# Patient Record
Sex: FEMALE | Race: WHITE | NOT HISPANIC OR LATINO | Employment: FULL TIME | URBAN - NONMETROPOLITAN AREA
[De-identification: names, ages, dates, MRNs, and addresses within clinical notes are randomized per-mention and may not be internally consistent; named-entity substitution may affect disease eponyms.]

---

## 2017-02-07 ENCOUNTER — OFFICE VISIT (OUTPATIENT)
Dept: INTERNAL MEDICINE | Facility: CLINIC | Age: 27
End: 2017-02-07

## 2017-02-07 VITALS
BODY MASS INDEX: 40.48 KG/M2 | TEMPERATURE: 98.5 F | OXYGEN SATURATION: 98 % | SYSTOLIC BLOOD PRESSURE: 116 MMHG | WEIGHT: 220 LBS | HEART RATE: 91 BPM | HEIGHT: 62 IN | DIASTOLIC BLOOD PRESSURE: 80 MMHG

## 2017-02-07 DIAGNOSIS — F41.9 ANXIETY: ICD-10-CM

## 2017-02-07 DIAGNOSIS — R63.5 WEIGHT GAIN: Primary | ICD-10-CM

## 2017-02-07 PROCEDURE — 99213 OFFICE O/P EST LOW 20 MIN: CPT | Performed by: PHYSICIAN ASSISTANT

## 2017-02-07 RX ORDER — SERTRALINE HYDROCHLORIDE 25 MG/1
25 TABLET, FILM COATED ORAL DAILY
Qty: 30 TABLET | Refills: 5 | Status: CANCELLED | OUTPATIENT
Start: 2017-02-07

## 2017-02-07 RX ORDER — CITALOPRAM 10 MG/1
10 TABLET ORAL DAILY
Qty: 30 TABLET | Refills: 0 | Status: SHIPPED | OUTPATIENT
Start: 2017-02-07 | End: 2017-03-07 | Stop reason: SDUPTHER

## 2017-02-07 NOTE — PROGRESS NOTES
Shae Allen is a 26 y.o. female.     Subjective   History of Present Illness   ?Here today for follow up of anxiety. Has been taking Zoloft which has been effective but may have contributed to weight gain of nearly 30 pounds. Sleeping well and feels she worries less about everything. Denies SI or HI.       The following portions of the patient's history were reviewed and updated as appropriate: allergies, current medications, past family history, past medical history, past social history, past surgical history and problem list.    Review of Systems    Constitutional: Weight gain.  Negative for appetite change, chills, fatigue, fever.  HENT: Negative for congestion, ear pain, hearing loss, nosebleeds, postnasal drip, rhinorrhea, sore throat, tinnitus and trouble swallowing.    Eyes: Negative for photophobia, discharge and visual disturbance.   Respiratory: Negative for cough, chest tightness, shortness of breath and wheezing.    Cardiovascular: Negative for chest pain, palpitations and leg swelling.   Gastrointestinal: Negative for abdominal distention, abdominal pain, blood in stool, constipation, diarrhea, nausea and vomiting.   Endocrine: Negative for cold intolerance, heat intolerance, polydipsia, polyphagia and polyuria.   Musculoskeletal: Negative for arthralgias, back pain, joint swelling, myalgias, neck pain and neck stiffness.   Skin: Negative for color change, pallor, rash and wound.   Allergic/Immunologic: Negative for environmental allergies, food allergies and immunocompromised state.   Neurological: Negative for dizziness, tremors, seizures, weakness, numbness and headaches.   Hematological: Negative for adenopathy. Does not bruise/bleed easily.   Psychiatric/Behavioral: Anxiety. Negative for agitation, behavioral problems, confusion, hallucinations, self-injury and suicidal ideas.     Objective    Physical Exam  Constitutional: Oriented to person, place, and time. Appears well-developed and  "well-nourished.   HENT:   Head: Normocephalic and atraumatic.   Eyes: EOM are normal. Pupils are equal, round, and reactive to light.   Neck: Normal range of motion. Neck supple.   Cardiovascular: Normal rate, regular rhythm and normal heart sounds.    Pulmonary/Chest: Effort normal and breath sounds normal. No respiratory distress.  Has no wheezes or rales. Exhibits no chest wall tenderness.   Abdominal: Soft. Bowel sounds are normal. Exhibits no distension and no mass. There is no tenderness.   Musculoskeletal: Normal range of motion. Exhibits no tenderness.   Neurological: Alert and oriented to person, place, and time.   Skin: Skin is warm and dry.   Psychiatric: Has a normal mood and affect. Behavior is normal. Judgment and thought content normal.       Visit Vitals   • /80   • Pulse 91   • Temp 98.5 °F (36.9 °C)   • Ht 62\" (157.5 cm)   • Wt 220 lb (99.8 kg)   • SpO2 98%   • BMI 40.24 kg/m2       Nursing note and vitals reviewed.        Assessment/Plan   Shae was seen today for follow-up and anxiety.    Diagnoses and all orders for this visit:    Weight gain    Anxiety  -     citalopram (CeleXA) 10 MG tablet; Take 1 tablet by mouth Daily.    Discontinue due to weight gain: sertraline (ZOLOFT) 25 MG tablet; Take 1 tablet by mouth Daily.               "

## 2017-03-07 ENCOUNTER — OFFICE VISIT (OUTPATIENT)
Dept: INTERNAL MEDICINE | Facility: CLINIC | Age: 27
End: 2017-03-07

## 2017-03-07 VITALS
OXYGEN SATURATION: 98 % | HEIGHT: 62 IN | DIASTOLIC BLOOD PRESSURE: 80 MMHG | WEIGHT: 218 LBS | TEMPERATURE: 98.4 F | SYSTOLIC BLOOD PRESSURE: 112 MMHG | BODY MASS INDEX: 40.12 KG/M2 | HEART RATE: 86 BPM

## 2017-03-07 DIAGNOSIS — F41.9 ANXIETY: Primary | ICD-10-CM

## 2017-03-07 PROCEDURE — 99213 OFFICE O/P EST LOW 20 MIN: CPT | Performed by: PHYSICIAN ASSISTANT

## 2017-03-07 RX ORDER — CITALOPRAM 10 MG/1
10 TABLET ORAL DAILY
Qty: 90 TABLET | Refills: 1 | Status: SHIPPED | OUTPATIENT
Start: 2017-03-07 | End: 2017-08-14 | Stop reason: SDUPTHER

## 2017-03-07 NOTE — PROGRESS NOTES
Shae Allen is a 26 y.o. female.     Subjective   History of Present Illness   Here today for follow up of anxiety. Has been using Celexa 10 mg and feels it has been helping to control her anxiety and feels this dose is effective for her. Has been sleeping well. Denies SI or HI. Has not had any weight gain with Celexa which she had with Zoloft.       The following portions of the patient's history were reviewed and updated as appropriate: allergies, current medications, past family history, past medical history, past social history, past surgical history and problem list.    Review of Systems    Constitutional: Negative for appetite change, chills, fatigue, fever and unexpected weight change.   HENT: Negative for congestion, ear pain, hearing loss, nosebleeds, postnasal drip, rhinorrhea, sore throat, tinnitus and trouble swallowing.    Eyes: Negative for photophobia, discharge and visual disturbance.   Respiratory: Negative for cough, chest tightness, shortness of breath and wheezing.    Cardiovascular: Negative for chest pain, palpitations and leg swelling.   Gastrointestinal: Negative for abdominal distention, abdominal pain, blood in stool, constipation, diarrhea, nausea and vomiting.   Endocrine: Negative for cold intolerance, heat intolerance, polydipsia, polyphagia and polyuria.   Musculoskeletal: Negative for arthralgias, back pain, joint swelling, myalgias, neck pain and neck stiffness.   Skin: Negative for color change, pallor, rash and wound.   Allergic/Immunologic: Negative for environmental allergies, food allergies and immunocompromised state.   Neurological: Negative for dizziness, tremors, seizures, weakness, numbness and headaches.   Hematological: Negative for adenopathy. Does not bruise/bleed easily.   Psychiatric/Behavioral: Anxiety. Negative for sleep disturbances, agitation, behavioral problems, confusion, hallucinations, self-injury and suicidal ideas.     Objective    Physical  "Exam  Constitutional: Oriented to person, place, and time. Appears well-developed and well-nourished.   HENT:   Head: Normocephalic and atraumatic.   Eyes: EOM are normal. Pupils are equal, round, and reactive to light.   Neck: Normal range of motion. Neck supple.   Cardiovascular: Normal rate, regular rhythm and normal heart sounds.    Pulmonary/Chest: Effort normal and breath sounds normal. No respiratory distress.  Has no wheezes or rales. Exhibits no chest wall tenderness.   Abdominal: Soft. Bowel sounds are normal. Exhibits no distension and no mass. There is no tenderness.   Musculoskeletal: Normal range of motion. Exhibits no tenderness.   Neurological: Alert and oriented to person, place, and time.   Skin: Skin is warm and dry.   Psychiatric: Has a normal mood and affect. Behavior is normal. Judgment and thought content normal.       Visit Vitals   • /80   • Pulse 86   • Temp 98.4 °F (36.9 °C)   • Ht 62\" (157.5 cm)   • Wt 218 lb (98.9 kg)   • SpO2 98%   • BMI 39.87 kg/m2       Nursing note and vitals reviewed.        Assessment/Plan   Shae was seen today for follow-up and anxiety.    Diagnoses and all orders for this visit:    Anxiety  -     citalopram (CeleXA) 10 MG tablet; Take 1 tablet by mouth Daily.      Well controlled with Celexa. Continue daily use. F/u 6 months or sooner if needed.          "

## 2017-06-02 DIAGNOSIS — Z30.011 INITIATION OF OCP (BCP): ICD-10-CM

## 2017-06-02 RX ORDER — NORGESTIMATE AND ETHINYL ESTRADIOL 7DAYSX3 28
1 KIT ORAL DAILY
Qty: 28 TABLET | Refills: 12 | Status: SHIPPED | OUTPATIENT
Start: 2017-06-02 | End: 2017-09-24

## 2017-08-14 DIAGNOSIS — F41.9 ANXIETY: ICD-10-CM

## 2017-08-14 RX ORDER — CITALOPRAM 10 MG/1
10 TABLET ORAL DAILY
Qty: 90 TABLET | Refills: 1 | Status: SHIPPED | OUTPATIENT
Start: 2017-08-14 | End: 2017-09-11 | Stop reason: SDUPTHER

## 2017-09-11 ENCOUNTER — OFFICE VISIT (OUTPATIENT)
Dept: INTERNAL MEDICINE | Facility: CLINIC | Age: 27
End: 2017-09-11

## 2017-09-11 VITALS
DIASTOLIC BLOOD PRESSURE: 70 MMHG | HEART RATE: 85 BPM | OXYGEN SATURATION: 99 % | SYSTOLIC BLOOD PRESSURE: 122 MMHG | WEIGHT: 216 LBS | RESPIRATION RATE: 12 BRPM | HEIGHT: 62 IN | BODY MASS INDEX: 39.75 KG/M2

## 2017-09-11 DIAGNOSIS — F41.1 GENERALIZED ANXIETY DISORDER: ICD-10-CM

## 2017-09-11 DIAGNOSIS — J45.20 ALLERGIC ASTHMA, MILD INTERMITTENT, UNCOMPLICATED: Primary | ICD-10-CM

## 2017-09-11 DIAGNOSIS — Z30.09 BIRTH CONTROL COUNSELING: ICD-10-CM

## 2017-09-11 PROBLEM — J45.909 ALLERGIC ASTHMA: Status: ACTIVE | Noted: 2017-09-11

## 2017-09-11 PROCEDURE — 99213 OFFICE O/P EST LOW 20 MIN: CPT | Performed by: FAMILY MEDICINE

## 2017-09-11 RX ORDER — CITALOPRAM 10 MG/1
10 TABLET ORAL DAILY
Qty: 90 TABLET | Refills: 1 | Status: SHIPPED | OUTPATIENT
Start: 2017-09-11 | End: 2018-02-19 | Stop reason: SDUPTHER

## 2017-09-11 RX ORDER — MONTELUKAST SODIUM 10 MG/1
10 TABLET ORAL NIGHTLY
Qty: 30 TABLET | Refills: 2 | Status: SHIPPED | OUTPATIENT
Start: 2017-09-11 | End: 2017-12-27 | Stop reason: SDUPTHER

## 2017-09-11 NOTE — PROGRESS NOTES
Est care with Dr. Ness. Needs refills Celexa 10 mg. She is doing well on this dose.        SUBJECTIVE: Shae Allen is a 27 y.o. female seen for a follow up visit;    Subjective   Shae Allen is a 27 y.o. female who presents for follow up of depression/anxiety.  At her last visit anti-depressant medication was started.   She complains of the following side effects from the treatment: none.  Symptoms have been gradually improving since starting treatment.  Depression symptoms still include none. Symptoms that have resolved include: depressed mood, anhedonia, anxiety and panic attacks.  Patient denies recurrent thoughts of death, suicidal thoughts without plan, suicidal thoughts with specific plan and panic attacks.  Previous treatment includes: medication.        OB History    Para Term  AB Living   0 0 0 0 0 0   SAB TAB Ectopic Multiple Live Births   0 0 0 0            Period Cycle (Days): 28  Period Duration (Days): 5 days, heavy x 1   Period Pattern: Regular  Menstrual Flow: Moderate  Sexual History:  Sexually Transmitted Infection History: None     reports that she currently engages in sexual activity and has had male partners. She reports using the following method of birth control/protection: OCP.      The following portions of the patient's history were reviewed and updated as appropriate: She  has a past medical history of Eczema and Pneumonia.  She has Generalized anxiety disorder on her pertinent problem list.  She  has a past surgical history that includes Myringotomy w/ tubes.  Her family history includes Arthritis in her paternal grandfather and paternal grandmother; Breast cancer in her paternal grandmother; COPD in her paternal grandmother; Cancer in her paternal grandmother and paternal uncle; Diabetes in her paternal grandfather; Hypertension in her mother; Lung cancer in her paternal grandmother; Migraines in her mother; No Known Problems in her father, maternal aunt,  "maternal aunt, maternal aunt, and maternal grandmother; Thyroid disease in her mother and paternal grandfather.  She  reports that she has never smoked. She has never used smokeless tobacco. She reports that she does not drink alcohol or use illicit drugs.  She has a current medication list which includes the following prescription(s): citalopram, fluticasone, loratadine, norgestimate-ethinyl estradiol, ventolin hfa, and montelukast..    Review of Systems   Constitutional: Negative.    Respiratory: Negative.    Cardiovascular: Negative.    Genitourinary: Negative.    Neurological: Negative.    Psychiatric/Behavioral: Negative.          OBJECTIVE:  /70  Pulse 85  Resp 12  Ht 62\" (157.5 cm)  Wt 216 lb (98 kg)  LMP 08/23/2017 (Exact Date)  SpO2 99%  BMI 39.51 kg/m2     Physical Exam   Constitutional: She appears well-developed and well-nourished. No distress.           ASSESSMENT:   Diagnosis Plan   1. Allergic asthma, mild intermittent, uncomplicated  montelukast (SINGULAIR) 10 MG tablet   2. Birth control counseling     3. Generalized anxiety disorder  citalopram (CeleXA) 10 MG tablet         Birth control counseling done today.  Discussed risks and benefits of birth control pill, patch no, ring no, nexplanon implant and mirena IUD.  Pt has decided to proceed with long acting birth control.    She has decided to proceed with nexplanon.  Risks and benefits of this device, including irregular periods, have been discussed with her.  Paperwork signed to order device and appointment for placement will be scheduled when device is received or insurance approval is gained.          I, Carmen Ness MD, hereby attest that the medical record entry above accurately reflects signatures/notations that I made in my capacity as Carmen Ness MD when I treated and diagnosed the above patient.  I do hereby attest that his information is true, accurate, and complete to the best of my knowledge and I understand that " any falsification, omission, or concealment of material fact may subject me to administrative, civil, or criminal liability.

## 2017-09-19 ENCOUNTER — TELEPHONE (OUTPATIENT)
Dept: INTERNAL MEDICINE | Facility: CLINIC | Age: 27
End: 2017-09-19

## 2017-09-21 ENCOUNTER — OFFICE VISIT (OUTPATIENT)
Dept: INTERNAL MEDICINE | Facility: CLINIC | Age: 27
End: 2017-09-21

## 2017-09-21 VITALS
WEIGHT: 216 LBS | HEART RATE: 77 BPM | OXYGEN SATURATION: 98 % | HEIGHT: 62 IN | BODY MASS INDEX: 39.75 KG/M2 | RESPIRATION RATE: 12 BRPM | DIASTOLIC BLOOD PRESSURE: 80 MMHG | SYSTOLIC BLOOD PRESSURE: 124 MMHG

## 2017-09-21 DIAGNOSIS — Z30.017 INSERTION OF NEXPLANON: Primary | ICD-10-CM

## 2017-09-21 LAB
B-HCG UR QL: NEGATIVE
INTERNAL NEGATIVE CONTROL: NEGATIVE
INTERNAL POSITIVE CONTROL: POSITIVE
Lab: NORMAL

## 2017-09-21 PROCEDURE — 11981 INSERTION DRUG DLVR IMPLANT: CPT | Performed by: FAMILY MEDICINE

## 2017-09-21 PROCEDURE — 81025 URINE PREGNANCY TEST: CPT | Performed by: FAMILY MEDICINE

## 2017-09-21 NOTE — PROGRESS NOTES
Nexplanon insertion  Nexplanon Insertion Procedure Note    Pre-operative Diagnosis: desires contraception    Post-operative Diagnosis: same    Indications: contraception    Procedure Details   Urine pregnancy test was done and result was negative.  The risks (including infection, bruising, irregular bleeding, pain at insertion site, and injury to muscles, nerves and blood vessesl) and benefits of the procedure were explained to the patient and/or guardian and Written informed consent was obtained.      Insertion site 10 cm from medial epicondyle of left arm, in between tricep and bicep tendons, was marked, painted with Betadine, and allowed to dry.  Insertion site anesthetized with 3 ml 2% lidocaine without epi.  Nexplanon device was inserted according to manufacturers instructions.  Site closed with steristrips.  Pt allowed to feel device in place.  Pressure dressing applied.  There were no complications.        Nexplanon Information:  nexplanon: Lot # [id number], Expiration date 08/2019      Condition:  Stable    Complications:  None    Plan:    The patient was advised to call for any rash, arm pain, fever, warmth or for prolonged bruising or bleeding. She was advised to use OTC ibuprofen as needed for mild to moderate pain.

## 2017-11-20 ENCOUNTER — OFFICE VISIT (OUTPATIENT)
Dept: INTERNAL MEDICINE | Facility: CLINIC | Age: 27
End: 2017-11-20

## 2017-11-20 VITALS
SYSTOLIC BLOOD PRESSURE: 110 MMHG | WEIGHT: 217 LBS | BODY MASS INDEX: 39.93 KG/M2 | RESPIRATION RATE: 12 BRPM | HEIGHT: 62 IN | HEART RATE: 85 BPM | DIASTOLIC BLOOD PRESSURE: 70 MMHG | OXYGEN SATURATION: 98 %

## 2017-11-20 DIAGNOSIS — Z23 NEEDS FLU SHOT: ICD-10-CM

## 2017-11-20 DIAGNOSIS — J45.20 MILD INTERMITTENT EXTRINSIC ASTHMA WITHOUT COMPLICATION: Primary | ICD-10-CM

## 2017-11-20 PROCEDURE — 90686 IIV4 VACC NO PRSV 0.5 ML IM: CPT | Performed by: FAMILY MEDICINE

## 2017-11-20 PROCEDURE — 99213 OFFICE O/P EST LOW 20 MIN: CPT | Performed by: FAMILY MEDICINE

## 2017-11-20 PROCEDURE — 90471 IMMUNIZATION ADMIN: CPT | Performed by: FAMILY MEDICINE

## 2017-11-20 RX ORDER — ETONOGESTREL 68 MG/1
IMPLANT SUBCUTANEOUS
COMMUNITY
Start: 2017-09-16 | End: 2018-11-07

## 2017-11-20 RX ORDER — ALBUTEROL SULFATE 90 UG/1
2 AEROSOL, METERED RESPIRATORY (INHALATION) EVERY 6 HOURS PRN
Qty: 1 INHALER | Refills: 2 | Status: SHIPPED | OUTPATIENT
Start: 2017-11-20

## 2017-12-27 DIAGNOSIS — J45.20 ALLERGIC ASTHMA, MILD INTERMITTENT, UNCOMPLICATED: ICD-10-CM

## 2017-12-28 RX ORDER — MONTELUKAST SODIUM 10 MG/1
TABLET ORAL
Qty: 30 TABLET | Refills: 5 | Status: SHIPPED | OUTPATIENT
Start: 2017-12-28 | End: 2018-07-16 | Stop reason: SDUPTHER

## 2018-02-19 ENCOUNTER — OFFICE VISIT (OUTPATIENT)
Dept: INTERNAL MEDICINE | Facility: CLINIC | Age: 28
End: 2018-02-19

## 2018-02-19 VITALS
BODY MASS INDEX: 41.04 KG/M2 | SYSTOLIC BLOOD PRESSURE: 114 MMHG | HEART RATE: 92 BPM | RESPIRATION RATE: 12 BRPM | WEIGHT: 223 LBS | OXYGEN SATURATION: 98 % | DIASTOLIC BLOOD PRESSURE: 70 MMHG | HEIGHT: 62 IN

## 2018-02-19 DIAGNOSIS — J45.20 MILD INTERMITTENT EXTRINSIC ASTHMA WITHOUT COMPLICATION: ICD-10-CM

## 2018-02-19 DIAGNOSIS — J06.9 VIRAL URI: ICD-10-CM

## 2018-02-19 DIAGNOSIS — Z00.00 HEALTHCARE MAINTENANCE: ICD-10-CM

## 2018-02-19 DIAGNOSIS — F41.1 GENERALIZED ANXIETY DISORDER: Primary | ICD-10-CM

## 2018-02-19 PROCEDURE — 99213 OFFICE O/P EST LOW 20 MIN: CPT | Performed by: FAMILY MEDICINE

## 2018-02-19 RX ORDER — CITALOPRAM 10 MG/1
10 TABLET ORAL DAILY
Qty: 90 TABLET | Refills: 3 | Status: SHIPPED | OUTPATIENT
Start: 2018-02-19 | End: 2018-10-08 | Stop reason: SDUPTHER

## 2018-02-19 RX ORDER — BROMPHENIRAMINE MALEATE, PSEUDOEPHEDRINE HYDROCHLORIDE, AND DEXTROMETHORPHAN HYDROBROMIDE 2; 30; 10 MG/5ML; MG/5ML; MG/5ML
SYRUP ORAL
COMMUNITY
Start: 2018-02-15 | End: 2018-02-19

## 2018-02-19 NOTE — PROGRESS NOTES
Follow up visit. C/O cold sx's past few weeks. She went to the [facility] last Thursday for right ear ache. Still stopped up today, would like to have it checked.     SUBJECTIVE: Shae Allen is a 27 y.o. female seen for a follow up visit;    Sinus Pain  Patient complains of congestion, cough, facial pain, frequent clearing of the throat, headaches, mouth breathing, post nasal drip, sinus pressure and sore throat. Onset of symptoms was 12 days ago. Symptoms have been gradually improving since that time. She is drinking plenty of fluids.  Past history is significant for asthma. Patient is non-smoker.     Asthma: has been having to use it a bit more due to colds, only about once a week.      The following portions of the patient's history were reviewed and updated as appropriate: She  has a past medical history of Eczema and Pneumonia.  She has Generalized anxiety disorder on her pertinent problem list.  She  has a past surgical history that includes Myringotomy w/ tubes.  Her family history includes Arthritis in her paternal grandfather and paternal grandmother; Breast cancer in her paternal grandmother; COPD in her paternal grandmother; Cancer in her paternal grandmother and paternal uncle; Diabetes in her paternal grandfather; Hypertension in her mother; Lung cancer in her paternal grandmother; Migraines in her mother; No Known Problems in her father, maternal aunt, maternal aunt, maternal aunt, and maternal grandmother; Thyroid disease in her mother and paternal grandfather.  She  reports that she has never smoked. She has never used smokeless tobacco. She reports that she does not drink alcohol or use illicit drugs.  She has a current medication list which includes the following prescription(s): albuterol, citalopram, fluticasone, loratadine, montelukast, and nexplanon..    Review of Systems   Constitutional: Negative.    HENT: Positive for congestion and ear pain.    Respiratory: Negative.   "  Cardiovascular: Negative.    Psychiatric/Behavioral: Negative.  Negative for sleep disturbance. The patient is not nervous/anxious.          OBJECTIVE:  /70  Pulse 92  Resp 12  Ht 157.5 cm (62\")  Wt 101 kg (223 lb)  SpO2 98%  BMI 40.79 kg/m2     Physical Exam   Constitutional: She appears well-developed and well-nourished. No distress.   HENT:   Head: Normocephalic and atraumatic.   Right Ear: Tympanic membrane, external ear and ear canal normal.   Left Ear: Tympanic membrane, external ear and ear canal normal.   Nose: Mucosal edema and rhinorrhea present.   Mouth/Throat: No uvula swelling. Posterior oropharyngeal erythema present. No tonsillar abscesses. No tonsillar exudate.   Eyes: Conjunctivae are normal. Pupils are equal, round, and reactive to light.   Cardiovascular: Normal rate and regular rhythm.    Pulmonary/Chest: Effort normal and breath sounds normal.           ASSESSMENT:   Diagnosis Plan   1. Generalized anxiety disorder  citalopram (CeleXA) 10 MG tablet   2. Healthcare maintenance  Comprehensive Metabolic Panel    TSH    T4   3. Mild intermittent extrinsic asthma without complication     4. Viral URI           Patient advised to call if worsening in 4 days or not resolved in 8 days.  Will prescribe bacterial sinus infection antibiotics at that time.  Recommended afrin x 5 days.     Asthma - stable      Return in about 6 months (around 8/19/2018).                "

## 2018-02-20 LAB
ALBUMIN SERPL-MCNC: 4.4 G/DL (ref 3.5–5)
ALBUMIN/GLOB SERPL: 1.5 G/DL (ref 1–2)
ALP SERPL-CCNC: 98 U/L (ref 38–126)
ALT SERPL-CCNC: 35 U/L (ref 13–69)
AST SERPL-CCNC: 21 U/L (ref 15–46)
BILIRUB SERPL-MCNC: 0.4 MG/DL (ref 0.2–1.3)
BUN SERPL-MCNC: 12 MG/DL (ref 7–20)
BUN/CREAT SERPL: 15 (ref 7.1–23.5)
CALCIUM SERPL-MCNC: 9.7 MG/DL (ref 8.4–10.2)
CHLORIDE SERPL-SCNC: 104 MMOL/L (ref 98–107)
CO2 SERPL-SCNC: 27 MMOL/L (ref 26–30)
CREAT SERPL-MCNC: 0.8 MG/DL (ref 0.6–1.3)
GFR SERPLBLD CREATININE-BSD FMLA CKD-EPI: 104 ML/MIN/1.73
GFR SERPLBLD CREATININE-BSD FMLA CKD-EPI: 85 ML/MIN/1.73
GLOBULIN SER CALC-MCNC: 2.9 GM/DL
GLUCOSE SERPL-MCNC: 89 MG/DL (ref 74–98)
POTASSIUM SERPL-SCNC: 3.9 MMOL/L (ref 3.5–5.1)
PROT SERPL-MCNC: 7.3 G/DL (ref 6.3–8.2)
SODIUM SERPL-SCNC: 146 MMOL/L (ref 137–145)
T4 SERPL-MCNC: 8.3 UG/DL (ref 4.5–12)
TSH SERPL DL<=0.005 MIU/L-ACNC: 2.47 MIU/ML (ref 0.47–4.68)

## 2018-07-16 DIAGNOSIS — J45.20 ALLERGIC ASTHMA, MILD INTERMITTENT, UNCOMPLICATED: ICD-10-CM

## 2018-07-16 RX ORDER — MONTELUKAST SODIUM 10 MG/1
10 TABLET ORAL EVERY EVENING
Qty: 30 TABLET | Refills: 5 | Status: SHIPPED | OUTPATIENT
Start: 2018-07-16 | End: 2018-10-08 | Stop reason: SDUPTHER

## 2018-10-08 ENCOUNTER — OFFICE VISIT (OUTPATIENT)
Dept: INTERNAL MEDICINE | Facility: CLINIC | Age: 28
End: 2018-10-08

## 2018-10-08 VITALS
OXYGEN SATURATION: 98 % | TEMPERATURE: 98.6 F | SYSTOLIC BLOOD PRESSURE: 120 MMHG | HEART RATE: 80 BPM | BODY MASS INDEX: 37.38 KG/M2 | HEIGHT: 62 IN | WEIGHT: 203.13 LBS | DIASTOLIC BLOOD PRESSURE: 80 MMHG

## 2018-10-08 DIAGNOSIS — Z97.5 NEXPLANON IN PLACE: ICD-10-CM

## 2018-10-08 DIAGNOSIS — J45.20 ALLERGIC ASTHMA, MILD INTERMITTENT, UNCOMPLICATED: ICD-10-CM

## 2018-10-08 DIAGNOSIS — F41.1 GENERALIZED ANXIETY DISORDER: Primary | ICD-10-CM

## 2018-10-08 PROCEDURE — 99213 OFFICE O/P EST LOW 20 MIN: CPT | Performed by: FAMILY MEDICINE

## 2018-10-08 RX ORDER — MONTELUKAST SODIUM 10 MG/1
10 TABLET ORAL EVERY EVENING
Qty: 90 TABLET | Refills: 3 | Status: SHIPPED | OUTPATIENT
Start: 2018-10-08 | End: 2018-12-28 | Stop reason: SDUPTHER

## 2018-10-08 RX ORDER — CITALOPRAM 10 MG/1
10 TABLET ORAL DAILY
Qty: 90 TABLET | Refills: 3 | Status: SHIPPED | OUTPATIENT
Start: 2018-10-08 | End: 2018-12-22

## 2018-10-08 NOTE — PROGRESS NOTES
"oJycelyn Allen is a 28 y.o. female here for:  Chief Complaint   Patient presents with   • Establish Care     Establish care with Dr. Khan, Transfer of care from Dr. Ness   • Anxiety     8 month follow up for Anxiety     History of Present Illness   Feels anxiety is doing well on Celexa. Nexplanon in place x 1 year. Put in as she was forgetting pills. Went a long time without period now has irregular bleeding and feels more mcgregor, irritable. Acne worsened. She would like to have it removed and return to oral contraceptive pill. No history of blood clot nor migraine, not a smoker.    History of reactive airways, less albuterol use since starting singulair.    The following portions of the patient's history were reviewed and updated as appropriate: allergies, current medications, past family history, past medical history, past social history, past surgical history and problem list.    Review of Systems   Genitourinary: Positive for menstrual problem.   Skin: Positive for skin lesions (acne).   Psychiatric/Behavioral: Positive for agitation. The patient is not nervous/anxious.        Vitals:    10/08/18 1608   BP: 120/80   Pulse: 80   Temp: 98.6 °F (37 °C)   SpO2: 98%   Weight: 92.1 kg (203 lb 2 oz)   Height: 157.5 cm (62.01\")         Objective   Physical Exam   Constitutional: She is oriented to person, place, and time. Vital signs are normal. She appears well-developed and well-nourished. She is active. She does not appear ill. No distress. She is obese.  HENT:   Head: Normocephalic and atraumatic.   Right Ear: Hearing normal.   Left Ear: Hearing normal.   Mouth/Throat: Mucous membranes are not dry.   Eyes: EOM are normal. No scleral icterus.   Neck: Neck supple.   Pulmonary/Chest: Effort normal.   Neurological: She is alert and oriented to person, place, and time. No cranial nerve deficit.   Skin: Skin is warm. She is not diaphoretic.   Psychiatric: She has a normal mood and affect. Her behavior " is normal.   Nursing note and vitals reviewed.        Assessment/Plan     Problem List Items Addressed This Visit        Other    Generalized anxiety disorder - Primary    Relevant Medications    citalopram (CeleXA) 10 MG tablet      Other Visit Diagnoses     Allergic asthma, mild intermittent, uncomplicated        Relevant Medications    montelukast (SINGULAIR) 10 MG tablet    Nexplanon in place              · She'll return to clinic to have Nexplanon removed and will resume oral contraceptive pill.    Return nexplanon removal when she can come in, 30 min.    Kami Khan MD

## 2018-11-07 ENCOUNTER — PROCEDURE VISIT (OUTPATIENT)
Dept: INTERNAL MEDICINE | Facility: CLINIC | Age: 28
End: 2018-11-07

## 2018-11-07 VITALS
WEIGHT: 203.38 LBS | DIASTOLIC BLOOD PRESSURE: 80 MMHG | OXYGEN SATURATION: 98 % | HEART RATE: 92 BPM | SYSTOLIC BLOOD PRESSURE: 115 MMHG | HEIGHT: 62 IN | TEMPERATURE: 98.2 F | BODY MASS INDEX: 37.43 KG/M2

## 2018-11-07 DIAGNOSIS — Z30.011 INITIATION OF OCP (BCP): ICD-10-CM

## 2018-11-07 DIAGNOSIS — Z30.46 NEXPLANON REMOVAL: Primary | ICD-10-CM

## 2018-11-07 DIAGNOSIS — Z23 NEED FOR INFLUENZA VACCINATION: ICD-10-CM

## 2018-11-07 PROCEDURE — 11982 REMOVE DRUG IMPLANT DEVICE: CPT | Performed by: FAMILY MEDICINE

## 2018-11-07 PROCEDURE — 90471 IMMUNIZATION ADMIN: CPT | Performed by: FAMILY MEDICINE

## 2018-11-07 PROCEDURE — 90674 CCIIV4 VAC NO PRSV 0.5 ML IM: CPT | Performed by: FAMILY MEDICINE

## 2018-11-07 RX ORDER — NORGESTIMATE AND ETHINYL ESTRADIOL 7DAYSX3 28
1 KIT ORAL DAILY
Qty: 28 TABLET | Refills: 12 | Status: SHIPPED | OUTPATIENT
Start: 2018-11-07 | End: 2019-11-01 | Stop reason: SDUPTHER

## 2018-11-08 NOTE — PROGRESS NOTES
Procedure   Remove & Insert Drug Implant  Date/Time: 11/7/2018 3:30 PM  Performed by: DONALDO MAJANO  Authorized by: DONALDO MAJANO   Consent: Verbal consent obtained. Written consent obtained.  Risks and benefits: risks, benefits and alternatives were discussed  Consent given by: patient  Patient understanding: patient states understanding of the procedure being performed  Patient consent: the patient's understanding of the procedure matches consent given  Procedure consent: procedure consent matches procedure scheduled  Relevant documents: relevant documents present and verified  Site marked: the operative site was marked  Required items: required blood products, implants, devices, and special equipment available  Patient identity confirmed: verbally with patient  Preparation: Left upper arm medial aspect, area of nexplanon insertion, cleaned with iodine x 3.  Local anesthesia used: yes    Anesthesia:  Local anesthesia used: yes  Local Anesthetic: lidocaine 2% with epinephrine  Anesthetic total: 1 mL    Sedation:  Patient sedated: no  Patient tolerance: Patient tolerated the procedure well with no immediate complications  Comments: Using scalpel after skin anesthetized a small incision was made and the distal tip of Nexplanon was grabbed using needle . The device came out smoothly and in one piece. Arm had to be palpated and manipulated for several minutes to get the tip of the Nexplanon to the incision. Estimated blood loss minimal. Steri-strips placed with incision edges approximated as close together as possible to minimize scaring. Second Steri-Strip adhered quickly and was not completely flush with skin. No complications.      Shae was seen today for procedure.    Diagnoses and all orders for this visit:    Nexplanon removal  -     Remove & Insert Drug Implant    Initiation of OCP (BCP)  -     norgestimate-ethinyl estradiol (ORTHO TRI-CYCLEN,TRINESSA) 0.18/0.215/0.25 MG-35 MCG per  tablet; Take 1 tablet by mouth Daily.    Need for influenza vaccination  -     Flucelvax Quad=>4Years ([id number])      Patient requested resuming her oral contraceptive pill, felt she could remember to take. She did not like the acne and irregular bleeding from her Nexplanon.

## 2018-12-22 DIAGNOSIS — F41.1 GENERALIZED ANXIETY DISORDER: ICD-10-CM

## 2018-12-22 RX ORDER — CITALOPRAM 20 MG/1
20 TABLET ORAL DAILY
Qty: 90 TABLET | Refills: 3 | Status: SHIPPED | OUTPATIENT
Start: 2018-12-22 | End: 2019-12-03 | Stop reason: SDUPTHER

## 2018-12-28 DIAGNOSIS — J45.20 ALLERGIC ASTHMA, MILD INTERMITTENT, UNCOMPLICATED: ICD-10-CM

## 2018-12-28 RX ORDER — MONTELUKAST SODIUM 10 MG/1
10 TABLET ORAL EVERY EVENING
Qty: 90 TABLET | Refills: 2 | Status: SHIPPED | OUTPATIENT
Start: 2018-12-28 | End: 2019-09-10 | Stop reason: SDUPTHER

## 2019-09-10 DIAGNOSIS — J45.20 ALLERGIC ASTHMA, MILD INTERMITTENT, UNCOMPLICATED: ICD-10-CM

## 2019-09-10 RX ORDER — MONTELUKAST SODIUM 10 MG/1
TABLET ORAL
Qty: 30 TABLET | Refills: 1 | Status: SHIPPED | OUTPATIENT
Start: 2019-09-10 | End: 2019-11-05 | Stop reason: SDUPTHER

## 2019-11-01 ENCOUNTER — PROCEDURE VISIT (OUTPATIENT)
Dept: OBSTETRICS AND GYNECOLOGY | Facility: CLINIC | Age: 29
End: 2019-11-01

## 2019-11-01 VITALS
HEIGHT: 62 IN | BODY MASS INDEX: 40.3 KG/M2 | WEIGHT: 219 LBS | DIASTOLIC BLOOD PRESSURE: 82 MMHG | SYSTOLIC BLOOD PRESSURE: 136 MMHG

## 2019-11-01 DIAGNOSIS — Z12.4 SCREENING FOR MALIGNANT NEOPLASM OF CERVIX: ICD-10-CM

## 2019-11-01 DIAGNOSIS — Z30.011 INITIATION OF OCP (BCP): ICD-10-CM

## 2019-11-01 DIAGNOSIS — Z01.419 ENCOUNTER FOR GYNECOLOGICAL EXAMINATION WITHOUT ABNORMAL FINDING: ICD-10-CM

## 2019-11-01 DIAGNOSIS — Z30.41 ENCOUNTER FOR SURVEILLANCE OF CONTRACEPTIVE PILLS: Primary | ICD-10-CM

## 2019-11-01 PROCEDURE — 99385 PREV VISIT NEW AGE 18-39: CPT | Performed by: MIDWIFE

## 2019-11-01 RX ORDER — NORGESTIMATE AND ETHINYL ESTRADIOL 7DAYSX3 28
1 KIT ORAL DAILY
Qty: 28 TABLET | Refills: 12 | Status: SHIPPED | OUTPATIENT
Start: 2019-11-01 | End: 2020-10-29

## 2019-11-02 NOTE — PROGRESS NOTES
Subjective   Chief Complaint   Patient presents with   • Gynecologic Exam     last pap , needs refill on birth control, no complaints      Shae Allen is a 29 y.o. year old  presenting to be seen for her annual exam.   She is here to establish care and refill on OCs.  She is doing well on her OC. Her periods are monthly lasting 6 days with moderate flow with some cramping.  She is considering pregnancy within the next few years.  She has tried Nexplanon in the past.    Past Medical History:   Diagnosis Date   • Abnormal Pap smear of cervix    • Allergic asthma    • Anxiety 2016    I've always struggled w/anxiety. On meds for 3 years   • Depression 2016    I have times where I feel depressed.  My anxiety meds help   • Eczema    • Pneumonia         Current Outpatient Medications:   •  albuterol (PROVENTIL HFA;VENTOLIN HFA) 108 (90 Base) MCG/ACT inhaler, Inhale 2 puffs Every 6 (Six) Hours As Needed for Wheezing., Disp: 1 inhaler, Rfl: 2  •  citalopram (CeleXA) 20 MG tablet, Take 1 tablet by mouth Daily., Disp: 90 tablet, Rfl: 3  •  montelukast (SINGULAIR) 10 MG tablet, TAKE 1 TABLET BY MOUTH IN THE EVENING , Disp: 30 tablet, Rfl: 1  •  norgestimate-ethinyl estradiol (ORTHO TRI-CYCLEN,TRINESSA) 0.18/0.215/0.25 MG-35 MCG per tablet, Take 1 tablet by mouth Daily., Disp: 28 tablet, Rfl: 12   Allergies   Allergen Reactions   • Penicillins       Past Surgical History:   Procedure Laterality Date   • MYRINGOTOMY W/ TUBES        Social History     Socioeconomic History   • Marital status: Single     Spouse name: Not on file   • Number of children: Not on file   • Years of education: Not on file   • Highest education level: Not on file   Tobacco Use   • Smoking status: Never Smoker   • Smokeless tobacco: Never Used   Substance and Sexual Activity   • Alcohol use: No   • Drug use: No   • Sexual activity: Yes     Partners: Male     Birth control/protection: OCP      Family History   Problem Relation  "Age of Onset   • Hypertension Mother    • Migraines Mother    • Thyroid disease Mother    • Arthritis Paternal Grandmother    • Cancer Paternal Grandmother    • Breast cancer Paternal Grandmother    • Lung cancer Paternal Grandmother    • COPD Paternal Grandmother    • Arthritis Paternal Grandfather    • Diabetes Paternal Grandfather    • Thyroid disease Paternal Grandfather    • No Known Problems Father    • No Known Problems Maternal Aunt    • Cancer Paternal Uncle         lymphoma   • No Known Problems Maternal Grandmother    • No Known Problems Maternal Aunt    • No Known Problems Maternal Aunt    • Ovarian cancer Other        The following portions of the patient's history were reviewed and updated as appropriate:problem list, current medications, allergies, past family history, past medical history, past social history and past surgical history.    Review of Systems   Constitutional: Negative.    Respiratory: Negative.    Cardiovascular: Negative.    Gastrointestinal: Negative.    Genitourinary: Negative.    Musculoskeletal: Negative.    Neurological: Negative.    Psychiatric/Behavioral: Negative.    All other systems reviewed and are negative.          Objective   /82   Ht 157.5 cm (62\")   Wt 99.3 kg (219 lb)   Breastfeeding? No   BMI 40.06 kg/m²     Physical Exam   Constitutional   The patient is awake, alert, well developed, well nourished and well groomed.   Neck   The neck is supple and the trachea is midline. The thyroid is not enlarged and there are no palpable nodules.   Breast  Inspection of the breast reveals symmetrical and smooth breast bilaterally without skin color changes, lesions, dimpling or skin retraction.  The nipples are  everted and without discharge.  Palpation of the breast the tissue is non-tender, smooth, with fibrocystic lumps.  The axillae are without masses.   Respiratory  The patient is relaxed and breathes without effort. Lungs CTAB  Cardiovascular  Heart regular rate " and rhythm without murmur.  Gastrointestinal   The abdomen is soft and non-tender.  No hepatosplenomegaly.  Genitourinary   - External Genitalia without erythema, lesions, or masses  -Urethral Meatus is without erythema, edema, prolapse or lesions.   -Bladder - Palpation at the region above the symphysis pubis             reveals no bladder tenderness.   -Vagina - There is no excessive vaginal discharge.&    vaginal walls reveals moist vaginal mucosa without inflammation or lesions    There is no evidence of pelvic relaxation; there is no cystocele or rectocele.  -Cervix  is smooth, pink, and without discharge. Negative cervical motion tenderness   Uterus - uterine body is of normal size, shape, & without tenderness  Adnexa structures are nontender and without masses  Perineum is without inflammation or lesions   Extremities  Full ROM. No cyanosis or edema   Skin  Normal in color, texture, moisture, temperature, mobility and turgor. There are no abnormal lesions.    Psychiatric  The patient is oriented to person, place, and time. Speech is fluent and words are clear          Assessment /Plan      Shae was seen today for gynecologic exam.    Diagnoses and all orders for this visit:    Encounter for surveillance of contraceptive pills    Screening for malignant neoplasm of cervix  -     Liquid-based Pap Smear, Screening; Future    Encounter for gynecological examination without abnormal finding    Initiation of OCP (BCP)  -     norgestimate-ethinyl estradiol (ORTHO TRI-CYCLEN,TRINESSA) 0.18/0.215/0.25 MG-35 MCG per tablet; Take 1 tablet by mouth Daily.      Preconceptual counseling: healthy diet, exercise, PNV         Encouraged BSE    This note was electronically signed.    Taylor Carranza CNM   November 1, 2019

## 2019-11-05 DIAGNOSIS — J45.20 ALLERGIC ASTHMA, MILD INTERMITTENT, UNCOMPLICATED: ICD-10-CM

## 2019-11-05 RX ORDER — MONTELUKAST SODIUM 10 MG/1
TABLET ORAL
Qty: 30 TABLET | Refills: 0 | Status: SHIPPED | OUTPATIENT
Start: 2019-11-05 | End: 2019-12-03 | Stop reason: SDUPTHER

## 2019-11-11 DIAGNOSIS — Z12.4 SCREENING FOR MALIGNANT NEOPLASM OF CERVIX: ICD-10-CM

## 2019-12-03 DIAGNOSIS — J45.20 ALLERGIC ASTHMA, MILD INTERMITTENT, UNCOMPLICATED: ICD-10-CM

## 2019-12-03 DIAGNOSIS — F41.1 GENERALIZED ANXIETY DISORDER: ICD-10-CM

## 2019-12-03 RX ORDER — CITALOPRAM 20 MG/1
TABLET ORAL
Qty: 30 TABLET | Refills: 0 | Status: SHIPPED | OUTPATIENT
Start: 2019-12-03 | End: 2020-01-08 | Stop reason: SDUPTHER

## 2019-12-03 RX ORDER — MONTELUKAST SODIUM 10 MG/1
TABLET ORAL
Qty: 30 TABLET | Refills: 0 | Status: SHIPPED | OUTPATIENT
Start: 2019-12-03 | End: 2019-12-31

## 2019-12-31 DIAGNOSIS — F41.1 GENERALIZED ANXIETY DISORDER: ICD-10-CM

## 2019-12-31 DIAGNOSIS — J45.20 ALLERGIC ASTHMA, MILD INTERMITTENT, UNCOMPLICATED: ICD-10-CM

## 2019-12-31 RX ORDER — MONTELUKAST SODIUM 10 MG/1
TABLET ORAL
Qty: 30 TABLET | Refills: 0 | Status: SHIPPED | OUTPATIENT
Start: 2019-12-31 | End: 2020-02-06

## 2019-12-31 RX ORDER — CITALOPRAM 20 MG/1
TABLET ORAL
Qty: 30 TABLET | Refills: 2 | OUTPATIENT
Start: 2019-12-31

## 2020-01-08 ENCOUNTER — OFFICE VISIT (OUTPATIENT)
Dept: INTERNAL MEDICINE | Facility: CLINIC | Age: 30
End: 2020-01-08

## 2020-01-08 VITALS
HEIGHT: 62 IN | OXYGEN SATURATION: 99 % | SYSTOLIC BLOOD PRESSURE: 110 MMHG | WEIGHT: 228.25 LBS | DIASTOLIC BLOOD PRESSURE: 70 MMHG | BODY MASS INDEX: 42 KG/M2 | HEART RATE: 86 BPM | TEMPERATURE: 97.1 F | RESPIRATION RATE: 18 BRPM

## 2020-01-08 DIAGNOSIS — F41.1 GENERALIZED ANXIETY DISORDER: ICD-10-CM

## 2020-01-08 PROCEDURE — 99213 OFFICE O/P EST LOW 20 MIN: CPT | Performed by: FAMILY MEDICINE

## 2020-01-08 RX ORDER — CITALOPRAM 40 MG/1
40 TABLET ORAL DAILY
Qty: 90 TABLET | Refills: 3 | Status: SHIPPED | OUTPATIENT
Start: 2020-01-08 | End: 2021-01-19

## 2020-01-08 RX ORDER — KETOCONAZOLE 20 MG/ML
SHAMPOO TOPICAL
COMMUNITY
Start: 2019-12-20

## 2020-01-08 RX ORDER — AZITHROMYCIN 250 MG/1
TABLET, FILM COATED ORAL
COMMUNITY
Start: 2020-01-06 | End: 2021-02-05

## 2020-01-08 NOTE — PROGRESS NOTES
"Joycelyn Allen is a 29 y.o. female here for:    Chief Complaint   Patient presents with   • Anxiety     Citalopram works well for her. Feels she could benefit from increasing the dosage due to her anxiety.        Anxiety is a chronic issue that is bothersome daily. Celexa has helped but she's not yet at goal. No side effects appreciated.     History per MA reviewed.           The following portions of the patient's history were reviewed and updated as appropriate: allergies, current medications, past family history, past medical history, past social history, past surgical history and problem list.    Review of Systems   Psychiatric/Behavioral: Negative for self-injury. The patient is nervous/anxious.        Visit Vitals  /70   Pulse 86   Temp 97.1 °F (36.2 °C) (Temporal)   Resp 18   Ht 157.5 cm (62.01\")   Wt 104 kg (228 lb 4 oz)   LMP 01/02/2020   SpO2 99%   BMI 41.74 kg/m²         Objective   Physical Exam   Constitutional: She is oriented to person, place, and time. Vital signs are normal. She appears well-developed and well-nourished. She is active.  Non-toxic appearance. She does not have a sickly appearance. She does not appear ill. No distress. She is morbidly obese.  HENT:   Head: Normocephalic and atraumatic.   Right Ear: Hearing normal.   Left Ear: Hearing normal.   Mouth/Throat: Mucous membranes are not dry.   Eyes: EOM are normal. No scleral icterus.   Neck: Phonation normal. Neck supple.   Pulmonary/Chest: Effort normal.   Neurological: She is alert and oriented to person, place, and time. She displays no tremor. No cranial nerve deficit.   Skin: Skin is warm. No rash noted. She is not diaphoretic. No pallor.   Psychiatric: She has a normal mood and affect. Her speech is normal and behavior is normal. Judgment and thought content normal. Cognition and memory are normal.   Nursing note and vitals reviewed.        Assessment/Plan     Problem List Items Addressed This Visit        " Other    Generalized anxiety disorder    Relevant Medications    citalopram (CeleXA) 40 MG tablet          ·     Kami Khan MD

## 2020-02-06 DIAGNOSIS — J45.20 ALLERGIC ASTHMA, MILD INTERMITTENT, UNCOMPLICATED: ICD-10-CM

## 2020-02-06 DIAGNOSIS — F41.1 GENERALIZED ANXIETY DISORDER: ICD-10-CM

## 2020-02-06 RX ORDER — CITALOPRAM 20 MG/1
TABLET ORAL
Qty: 90 TABLET | Refills: 0 | OUTPATIENT
Start: 2020-02-06

## 2020-02-06 RX ORDER — MONTELUKAST SODIUM 10 MG/1
TABLET ORAL
Qty: 30 TABLET | Refills: 5 | Status: SHIPPED | OUTPATIENT
Start: 2020-02-06 | End: 2020-09-10

## 2020-09-10 DIAGNOSIS — J45.20 ALLERGIC ASTHMA, MILD INTERMITTENT, UNCOMPLICATED: ICD-10-CM

## 2020-09-10 RX ORDER — MONTELUKAST SODIUM 10 MG/1
TABLET ORAL
Qty: 30 TABLET | Refills: 2 | Status: SHIPPED | OUTPATIENT
Start: 2020-09-10 | End: 2020-12-14

## 2020-10-29 DIAGNOSIS — Z30.011 INITIATION OF OCP (BCP): ICD-10-CM

## 2020-10-29 RX ORDER — NORGESTIMATE AND ETHINYL ESTRADIOL 7DAYSX3 28
KIT ORAL
Qty: 28 TABLET | Refills: 0 | Status: SHIPPED | OUTPATIENT
Start: 2020-10-29 | End: 2020-11-13 | Stop reason: ALTCHOICE

## 2020-11-13 ENCOUNTER — OFFICE VISIT (OUTPATIENT)
Dept: OBSTETRICS AND GYNECOLOGY | Facility: CLINIC | Age: 30
End: 2020-11-13

## 2020-11-13 VITALS
HEIGHT: 62 IN | SYSTOLIC BLOOD PRESSURE: 112 MMHG | BODY MASS INDEX: 40.82 KG/M2 | DIASTOLIC BLOOD PRESSURE: 72 MMHG | WEIGHT: 221.8 LBS

## 2020-11-13 DIAGNOSIS — Z30.41 ENCOUNTER FOR SURVEILLANCE OF CONTRACEPTIVE PILLS: ICD-10-CM

## 2020-11-13 DIAGNOSIS — Z30.011 INITIATION OF OCP (BCP): ICD-10-CM

## 2020-11-13 DIAGNOSIS — Z01.419 ENCOUNTER FOR GYNECOLOGICAL EXAMINATION WITHOUT ABNORMAL FINDING: Primary | ICD-10-CM

## 2020-11-13 PROCEDURE — 99395 PREV VISIT EST AGE 18-39: CPT | Performed by: MIDWIFE

## 2020-11-13 RX ORDER — LEVONORGESTREL / ETHINYL ESTRADIOL AND ETHINYL ESTRADIOL 150-30(84)
1 KIT ORAL DAILY
Qty: 84 EACH | Refills: 3 | Status: SHIPPED | OUTPATIENT
Start: 2020-11-13 | End: 2021-11-17 | Stop reason: SDUPTHER

## 2020-11-13 RX ORDER — NORGESTIMATE AND ETHINYL ESTRADIOL 7DAYSX3 28
1 KIT ORAL DAILY
Qty: 28 TABLET | Refills: 12 | Status: CANCELLED | OUTPATIENT
Start: 2020-11-13

## 2020-11-13 NOTE — PROGRESS NOTES
Subjective   Chief Complaint   Patient presents with   • Annual Exam     Patient here for annual and no pap. Patient has no complaints needs refill of birth control     Shae Allen is a 30 y.o. year old  presenting to be seen for her annual exam.  She is doing well on Tri-Sprintec but would like to have less periods.  She is interested in Seasonique.  She does not want to do a Pap smear this year.    Past Medical History:   Diagnosis Date   • Abnormal Pap smear of cervix    • Allergic asthma    • Anxiety 2016    I've always struggled w/anxiety. On meds for 3 years   • Depression 2016    I have times where I feel depressed.  My anxiety meds help   • Eczema    • Pneumonia    • Seborrheic dermatitis         Current Outpatient Medications:   •  albuterol (PROVENTIL HFA;VENTOLIN HFA) 108 (90 Base) MCG/ACT inhaler, Inhale 2 puffs Every 6 (Six) Hours As Needed for Wheezing., Disp: 1 inhaler, Rfl: 2  •  citalopram (CeleXA) 40 MG tablet, Take 1 tablet by mouth Daily., Disp: 90 tablet, Rfl: 3  •  ketoconazole (NIZORAL) 2 % shampoo, , Disp: , Rfl:   •  montelukast (SINGULAIR) 10 MG tablet, TAKE ONE TABLET BY MOUTH EVERY EVENING, Disp: 30 tablet, Rfl: 2  •  azithromycin (ZITHROMAX) 250 MG tablet, , Disp: , Rfl:   •  Levonorgest-Eth Estrad -Day 0.15-0.03 &0.01 MG tablet, Take 1 tablet/day by mouth Daily for 364 days., Disp: 84 each, Rfl: 3   Allergies   Allergen Reactions   • Penicillins       Past Surgical History:   Procedure Laterality Date   • MYRINGOTOMY W/ TUBES        Social History     Socioeconomic History   • Marital status: Single     Spouse name: Not on file   • Number of children: Not on file   • Years of education: Not on file   • Highest education level: Not on file   Social Needs   • Financial resource strain: Not hard at all   • Food insecurity     Worry: Never true     Inability: Never true   • Transportation needs     Medical: No     Non-medical: No   Tobacco Use   • Smoking status:  "Never Smoker   • Smokeless tobacco: Never Used   Substance and Sexual Activity   • Alcohol use: No   • Drug use: No   • Sexual activity: Yes     Partners: Male     Birth control/protection: OCP   Lifestyle   • Physical activity     Days per week: 4 days     Minutes per session: 30 min   • Stress: To some extent      Family History   Problem Relation Age of Onset   • Hypertension Mother    • Migraines Mother    • Thyroid disease Mother    • Arthritis Paternal Grandmother    • Cancer Paternal Grandmother    • Breast cancer Paternal Grandmother    • Lung cancer Paternal Grandmother    • COPD Paternal Grandmother    • Arthritis Paternal Grandfather    • Diabetes Paternal Grandfather    • Thyroid disease Paternal Grandfather    • No Known Problems Father    • No Known Problems Maternal Aunt    • Cancer Paternal Uncle         lymphoma   • No Known Problems Maternal Grandmother    • No Known Problems Maternal Aunt    • No Known Problems Maternal Aunt    • Ovarian cancer Other        The following portions of the patient's history were reviewed and updated as appropriate:problem list, current medications, allergies, past family history, past medical history, past social history and past surgical history.    Review of Systems   Constitutional: Negative.    Respiratory: Negative.    Cardiovascular: Negative.    Gastrointestinal: Negative.    Musculoskeletal: Negative.    Skin: Negative.    Psychiatric/Behavioral: Negative.    All other systems reviewed and are negative.          Objective   /72   Ht 157.5 cm (62\")   Wt 101 kg (221 lb 12.8 oz)   LMP 11/05/2020 (Exact Date)   Breastfeeding No   BMI 40.57 kg/m²     Physical Exam   Constitutional   The patient is awake, alert, well developed, well nourished and well groomed.   Neck   The neck is supple and the trachea is midline. The thyroid is not enlarged and there are no palpable nodules.   Breast  Inspection of the breast reveals symmetrical and smooth breast " bilaterally without skin color changes, lesions, dimpling or skin retraction.  The nipples are  everted and without discharge.  Palpation of the breast the tissue is non-tender, smooth, without masses.  The axillae are without masses.   Respiratory  The patient is relaxed and breathes without effort. Lungs CTAB  Cardiovascular  Heart regular rate and rhythm without murmur.  Gastrointestinal   The abdomen is soft and non-tender.  No hepatosplenomegaly.  Genitourinary   - External Genitalia without erythema, lesions, or masses  -Urethral Meatus is without erythema, edema, prolapse or lesions.   -Bladder - Palpation at the region above the symphysis pubis             reveals no bladder tenderness.   -Vagina - There is no excessive vaginal discharge   There is no evidence of pelvic relaxation; there is no cystocele or rectocele.  -Cervix Negative cervical motion tenderness   Uterus - uterine body is of normal size, shape, & without tenderness  Adnexa structures are nontender and without masses  Perineum is without inflammation or lesions   Extremities  Full ROM. No cyanosis or edema   Skin  Normal in color, texture, moisture, temperature, mobility and turgor. There are no abnormal lesions.    Psychiatric  The patient is oriented to person, place, and time. Speech is fluent and words are clear          Assessment /Plan      Diagnoses and all orders for this visit:    1. Encounter for gynecological examination without abnormal finding (Primary)    2. Encounter for surveillance of contraceptive pills    3. Initiation of OCP (BCP)    Other orders  -     Cancel: norgestimate-ethinyl estradiol (Tri-Sprintec) 0.18/0.215/0.25 MG-35 MCG per tablet; Take 1 tablet by mouth Daily.  Dispense: 28 tablet; Refill: 12  -     Levonorgest-Eth Estrad 91-Day 0.15-0.03 &0.01 MG tablet; Take 1 tablet/day by mouth Daily for 364 days.  Dispense: 84 each; Refill: 3    Discussed side effects of Seasonique such as irregular bleeding or spotting.   She will start this pill when it is time to start a new birth control pack.  Shae was counseled regarding having clinical breast exams and breast self-awareness.  Women aged 29-39 years of age should have clinical breast exams every 1-3 years .  The patient was counseled regarding breast self-awareness focusing on having a sense of what is normal for her breasts so that she can tell if there are changes.  Even small changes should be reported to provider.    Pap was not done today.  I explained to Shae that the recommendations for Pap smear interval in a low risk patient has lengthened to 3 years time.  I told Shae she still needs to be seen in our office yearly for a full physical including breast and pelvic exam.         Encouraged BSE, healthy eating, and exercise    This note was electronically signed.    Taylor Carranza CNM   November 13, 2020

## 2020-12-14 DIAGNOSIS — J45.20 ALLERGIC ASTHMA, MILD INTERMITTENT, UNCOMPLICATED: ICD-10-CM

## 2020-12-14 RX ORDER — MONTELUKAST SODIUM 10 MG/1
TABLET ORAL
Qty: 90 TABLET | Refills: 0 | Status: SHIPPED | OUTPATIENT
Start: 2020-12-14 | End: 2021-02-05 | Stop reason: SDUPTHER

## 2021-01-17 DIAGNOSIS — F41.1 GENERALIZED ANXIETY DISORDER: ICD-10-CM

## 2021-01-19 RX ORDER — CITALOPRAM 40 MG/1
TABLET ORAL
Qty: 90 TABLET | Refills: 1 | Status: SHIPPED | OUTPATIENT
Start: 2021-01-19 | End: 2021-02-05 | Stop reason: SDUPTHER

## 2021-02-05 ENCOUNTER — OFFICE VISIT (OUTPATIENT)
Dept: INTERNAL MEDICINE | Facility: CLINIC | Age: 31
End: 2021-02-05

## 2021-02-05 VITALS
WEIGHT: 218.13 LBS | OXYGEN SATURATION: 98 % | RESPIRATION RATE: 18 BRPM | HEART RATE: 73 BPM | SYSTOLIC BLOOD PRESSURE: 110 MMHG | TEMPERATURE: 97.8 F | BODY MASS INDEX: 40.14 KG/M2 | HEIGHT: 62 IN | DIASTOLIC BLOOD PRESSURE: 70 MMHG

## 2021-02-05 DIAGNOSIS — Z71.3 WEIGHT LOSS COUNSELING, ENCOUNTER FOR: ICD-10-CM

## 2021-02-05 DIAGNOSIS — E66.09 CLASS 2 OBESITY DUE TO EXCESS CALORIES WITHOUT SERIOUS COMORBIDITY WITH BODY MASS INDEX (BMI) OF 39.0 TO 39.9 IN ADULT: ICD-10-CM

## 2021-02-05 DIAGNOSIS — J45.20 ALLERGIC ASTHMA, MILD INTERMITTENT, UNCOMPLICATED: ICD-10-CM

## 2021-02-05 DIAGNOSIS — F41.1 GENERALIZED ANXIETY DISORDER: Primary | ICD-10-CM

## 2021-02-05 PROCEDURE — 99213 OFFICE O/P EST LOW 20 MIN: CPT | Performed by: FAMILY MEDICINE

## 2021-02-05 RX ORDER — CITALOPRAM 40 MG/1
40 TABLET ORAL DAILY
Qty: 90 TABLET | Refills: 3 | Status: SHIPPED | OUTPATIENT
Start: 2021-02-05 | End: 2022-02-07 | Stop reason: SDUPTHER

## 2021-02-05 RX ORDER — MONTELUKAST SODIUM 10 MG/1
10 TABLET ORAL EVERY EVENING
Qty: 90 TABLET | Refills: 3 | Status: SHIPPED | OUTPATIENT
Start: 2021-02-05 | End: 2022-02-07 | Stop reason: SDUPTHER

## 2021-03-02 ENCOUNTER — IMMUNIZATION (OUTPATIENT)
Dept: VACCINE CLINIC | Facility: HOSPITAL | Age: 31
End: 2021-03-02

## 2021-03-02 PROCEDURE — 0001A: CPT | Performed by: INTERNAL MEDICINE

## 2021-03-02 PROCEDURE — 91300 HC SARSCOV02 VAC 30MCG/0.3ML IM: CPT | Performed by: INTERNAL MEDICINE

## 2021-03-23 ENCOUNTER — IMMUNIZATION (OUTPATIENT)
Dept: VACCINE CLINIC | Facility: HOSPITAL | Age: 31
End: 2021-03-23

## 2021-03-23 PROCEDURE — 91300 HC SARSCOV02 VAC 30MCG/0.3ML IM: CPT | Performed by: INTERNAL MEDICINE

## 2021-03-23 PROCEDURE — 0002A: CPT | Performed by: INTERNAL MEDICINE

## 2021-11-17 ENCOUNTER — TELEPHONE (OUTPATIENT)
Dept: OBSTETRICS AND GYNECOLOGY | Facility: CLINIC | Age: 31
End: 2021-11-17

## 2021-11-17 RX ORDER — LEVONORGESTREL / ETHINYL ESTRADIOL AND ETHINYL ESTRADIOL 150-30(84)
KIT ORAL
OUTPATIENT
Start: 2021-11-17

## 2021-11-17 RX ORDER — LEVONORGESTREL / ETHINYL ESTRADIOL AND ETHINYL ESTRADIOL 150-30(84)
1 KIT ORAL DAILY
Qty: 84 EACH | Refills: 0 | Status: SHIPPED | OUTPATIENT
Start: 2021-11-17 | End: 2021-12-10 | Stop reason: SDUPTHER

## 2021-11-17 NOTE — TELEPHONE ENCOUNTER
----- Message from [place] Montalvo sent at 11/17/2021  1:55 PM EST -----  Pt requested her bc refill.     She is gin'd with [facility] on 12-03 for her annual.    RX: [facility], [place]

## 2021-12-10 ENCOUNTER — OFFICE VISIT (OUTPATIENT)
Dept: OBSTETRICS AND GYNECOLOGY | Facility: CLINIC | Age: 31
End: 2021-12-10

## 2021-12-10 VITALS
BODY MASS INDEX: 41.77 KG/M2 | WEIGHT: 227 LBS | HEIGHT: 62 IN | DIASTOLIC BLOOD PRESSURE: 70 MMHG | SYSTOLIC BLOOD PRESSURE: 110 MMHG

## 2021-12-10 DIAGNOSIS — Z12.39 ENCOUNTER FOR BREAST CANCER SCREENING OTHER THAN MAMMOGRAM: ICD-10-CM

## 2021-12-10 DIAGNOSIS — Z01.419 ENCOUNTER FOR GYNECOLOGICAL EXAMINATION WITHOUT ABNORMAL FINDING: Primary | ICD-10-CM

## 2021-12-10 DIAGNOSIS — Z30.41 ENCOUNTER FOR SURVEILLANCE OF CONTRACEPTIVE PILLS: ICD-10-CM

## 2021-12-10 PROCEDURE — 99395 PREV VISIT EST AGE 18-39: CPT | Performed by: MIDWIFE

## 2021-12-10 RX ORDER — LEVONORGESTREL / ETHINYL ESTRADIOL AND ETHINYL ESTRADIOL 150-30(84)
1 KIT ORAL DAILY
Qty: 91 EACH | Refills: 3 | Status: SHIPPED | OUTPATIENT
Start: 2021-12-10 | End: 2023-01-20 | Stop reason: SDUPTHER

## 2021-12-10 NOTE — PROGRESS NOTES
"Subjective   Chief Complaint   Patient presents with   • Gynecologic Exam     last pap 2020 WNL , No Complaints/concerns      Shae Allen is a 31 y.o. year old  presenting to be seen for her annual exam.   She is on generic Seasonique and doing well on this pill and wants to continue.  She does have spotting when she misses a pill.  No changes in her health history.    Past Medical History:   Diagnosis Date   • Abnormal Pap smear of cervix    • Allergic asthma    • Anxiety 2016    I've always struggled w/anxiety. On meds for 3 years   • Depression 2016    I have times where I feel depressed.  My anxiety meds help   • Eczema    • Pneumonia    • Seborrheic dermatitis       Past Surgical History:   Procedure Laterality Date   • MYRINGOTOMY W/ TUBES            The following portions of the patient's history were reviewed and updated as appropriate:problem list, current medications, allergies, past family history, past medical history, past social history and past surgical history.    Review of Systems   Constitutional: Negative.    Respiratory: Negative.    Cardiovascular: Negative.    Gastrointestinal: Negative.    Genitourinary: Negative.    Musculoskeletal: Negative.    Psychiatric/Behavioral: Negative.    All other systems reviewed and are negative.          Objective   /70   Ht 157.5 cm (62\")   Wt 103 kg (227 lb)   LMP 2021   BMI 41.52 kg/m²     Physical Exam   Constitutional   The patient is awake, alert, well developed, well nourished and well groomed.   Neck   The neck is supple and the trachea is midline. The thyroid is not enlarged and there are no palpable nodules.   Breast  Inspection of the breast reveals symmetrical and smooth breast bilaterally without skin color changes, lesions, dimpling or skin retraction.  The nipples are  everted and without discharge.  Palpation of the breast tissue is non-tender, smooth, without masses.  The axillae are without " masses.   Respiratory  The patient is relaxed and breathes without effort. Lungs CTAB  Cardiovascular  Heart regular rate and rhythm without murmur.  Gastrointestinal   The abdomen is soft and non-tender.  No hepatosplenomegaly.  Genitourinary   - External Genitalia without erythema, lesions, or masses  -Urethral Meatus is without erythema, edema, prolapse or lesions.   -Bladder - Palpation at the region above the symphysis pubis             reveals no bladder tenderness.   -Vagina - There is no excessive vaginal discharge.   Vaginal walls reveals moist vaginal mucosa without inflammation or lesions    There is no evidence of pelvic relaxation; there is no cystocele or rectocele.  -Cervix   Negative cervical motion tenderness   Uterus - uterine body is of normal size, shape, & without tenderness  Adnexa structures are nontender and without masses  Perineum is without inflammation or lesions   Extremities  Full ROM. No cyanosis or edema   Skin  Normal in color, texture, moisture, temperature, mobility and turgor. There are no abnormal lesions.    Psychiatric  The patient is oriented to person, place, and time. Speech is fluent and words are clear          Assessment /Plan      Diagnoses and all orders for this visit:    1. Encounter for gynecological examination without abnormal finding (Primary)  Encouraged healthy eating and exercise.  Pap was not done today.    I explained to Shae that the recommendations for Pap smear interval in a low risk patient has lengthened to 3 years time if cytology alone normal or  5 years time if both cytology and HPV testing were normal.  I encouraged her to be seen yearly for a full physical exam including breast and pelvic exam even during the off years when PAP's will not be performed.  2. Encounter for breast cancer screening other than mammogram  Encouraged self breast exam  3. Encounter for surveillance of contraceptive pills  -     Levonorgest-Eth Estrad 91-Day 0.15-0.03  &0.01 MG tablet; Take 1 tablet/day by mouth Daily for 364 days.  Dispense: 91 each; Refill: 3             This note was electronically signed.    Taylor Carranza CNM   December 10, 2021

## 2022-02-04 NOTE — PATIENT INSTRUCTIONS

## 2022-02-07 ENCOUNTER — OFFICE VISIT (OUTPATIENT)
Dept: INTERNAL MEDICINE | Facility: CLINIC | Age: 32
End: 2022-02-07

## 2022-02-07 VITALS
DIASTOLIC BLOOD PRESSURE: 82 MMHG | TEMPERATURE: 97.7 F | RESPIRATION RATE: 16 BRPM | SYSTOLIC BLOOD PRESSURE: 130 MMHG | HEIGHT: 62 IN | OXYGEN SATURATION: 98 % | BODY MASS INDEX: 41.44 KG/M2 | WEIGHT: 225.2 LBS | HEART RATE: 77 BPM

## 2022-02-07 DIAGNOSIS — E66.01 MORBID OBESITY: ICD-10-CM

## 2022-02-07 DIAGNOSIS — Z00.00 ANNUAL PHYSICAL EXAM: Primary | ICD-10-CM

## 2022-02-07 DIAGNOSIS — F41.1 GENERALIZED ANXIETY DISORDER: ICD-10-CM

## 2022-02-07 DIAGNOSIS — J45.20 ALLERGIC ASTHMA, MILD INTERMITTENT, UNCOMPLICATED: ICD-10-CM

## 2022-02-07 PROCEDURE — 99395 PREV VISIT EST AGE 18-39: CPT | Performed by: FAMILY MEDICINE

## 2022-02-07 RX ORDER — MONTELUKAST SODIUM 10 MG/1
10 TABLET ORAL EVERY EVENING
Qty: 90 TABLET | Refills: 3 | Status: SHIPPED | OUTPATIENT
Start: 2022-02-07 | End: 2023-02-16

## 2022-02-07 RX ORDER — CITALOPRAM 40 MG/1
40 TABLET ORAL DAILY
Qty: 90 TABLET | Refills: 3 | Status: SHIPPED | OUTPATIENT
Start: 2022-02-07

## 2022-02-07 NOTE — PROGRESS NOTES
02/07/2022  Chief Complaint   Patient presents with   • Annual Exam     annual physical       Shae Allen is here for her annual preventive exam. History per MA reviewed.     Doing weight watchers with boyfriend, has lost 8 lbs. Has caught herself cheating some.     meds working well.    Eye exam up to date. Dental up to date.       Shae Allen has the following medical issues:  Patient Active Problem List    Diagnosis    • Allergic asthma [J45.909]    • Generalized anxiety disorder [F41.1]    • Eczema [L30.9]        Health Maintenance   Topic Date Due   • ANNUAL PHYSICAL  Never done   • HEPATITIS C SCREENING  02/07/2022 (Originally 6/2/2016)   • Pneumococcal Vaccine 0-64 (1 of 2 - PPSV23) 02/01/2023 (Originally 4/1/1995)   • PAP SMEAR  11/01/2022   • TDAP/TD VACCINES (2 - Td or Tdap) 08/05/2026   • COVID-19 Vaccine  Completed   • INFLUENZA VACCINE  Completed       Immunization History   Administered Date(s) Administered   • COVID-19 (PFIZER) PURPLE CAP 03/02/2021, 03/23/2021, 11/16/2021   • Flu Vaccine Quad PF >18YRS 11/20/2017   • Flu Vaccine Quad PF >36MO 11/16/2021   • Influenza Quad Vaccine (Inpatient) 11/20/2017   • Influenza, Unspecified 11/07/2018   • Meningococcal Polysaccharide 03/26/2007   • Tdap 08/05/2016   • flucelvax quad pfs =>4 YRS 11/07/2018       Review of Systems   Constitutional: Positive for activity change and appetite change. Negative for fever.   Respiratory: Negative for cough and shortness of breath.    Gastrointestinal: Negative for abdominal pain.   Psychiatric/Behavioral: Negative for dysphoric mood.   All other systems reviewed and are negative.      The following portions of the patient's history were reviewed and updated as appropriate: allergies, current medications, past family history, past medical history, past social history, past surgical history and problem list.    Objective   Visit Vitals  /82 (BP Location: Right arm, Patient Position:  "Sitting, Cuff Size: Adult)   Pulse 77   Temp 97.7 °F (36.5 °C) (Temporal)   Resp 16   Ht 157.5 cm (62\")   Wt 102 kg (225 lb 3.2 oz)   SpO2 98%   BMI 41.19 kg/m²        Physical Exam  Vitals and nursing note reviewed.   Constitutional:       General: She is not in acute distress.     Appearance: Normal appearance. She is well-developed and well-groomed. She is morbidly obese. She is not ill-appearing, toxic-appearing or diaphoretic.      Interventions: Face mask in place.   HENT:      Head: Normocephalic and atraumatic. Hair is normal.      Right Ear: Hearing, tympanic membrane, ear canal and external ear normal.      Left Ear: Hearing, tympanic membrane, ear canal and external ear normal.   Eyes:      General: Lids are normal. Gaze aligned appropriately. No scleral icterus.        Right eye: No discharge.         Left eye: No discharge.      Extraocular Movements: Extraocular movements intact.      Conjunctiva/sclera: Conjunctivae normal.      Pupils: Pupils are equal, round, and reactive to light.   Neck:      Thyroid: No thyromegaly.      Trachea: Trachea and phonation normal. No tracheal deviation.   Cardiovascular:      Rate and Rhythm: Normal rate and regular rhythm.      Heart sounds: Normal heart sounds. No murmur heard.  No friction rub. No gallop.    Pulmonary:      Effort: Pulmonary effort is normal.      Breath sounds: Normal breath sounds and air entry.   Abdominal:      General: Bowel sounds are normal. There is no distension.      Palpations: Abdomen is soft. Abdomen is not rigid. There is no mass.      Tenderness: There is no abdominal tenderness. There is no guarding or rebound.   Musculoskeletal:         General: No tenderness or deformity.      Cervical back: Neck supple.      Right lower leg: No edema.      Left lower leg: No edema.   Skin:     General: Skin is warm.      Capillary Refill: Capillary refill takes less than 2 seconds.      Coloration: Skin is not cyanotic, jaundiced or pale.      " Findings: No erythema or rash.      Nails: There is no clubbing.   Neurological:      General: No focal deficit present.      Mental Status: She is alert and oriented to person, place, and time.      Cranial Nerves: No cranial nerve deficit.      Motor: No tremor, atrophy, abnormal muscle tone or seizure activity.      Gait: Gait is intact. Gait normal.   Psychiatric:         Attention and Perception: Attention and perception normal.         Mood and Affect: Mood and affect normal.         Speech: Speech normal.         Behavior: Behavior normal. Behavior is cooperative.         Thought Content: Thought content normal.         Cognition and Memory: Cognition and memory normal.         Judgment: Judgment normal.         No results found for: CHOL, CHLPL, TRIG, HDL, LDL, LDLDIRECT  Lab Results   Component Value Date    TSH 2.470 02/19/2018     No results found for: FREET4  No results found for: HGBA1C    Assessment     Problem List Items Addressed This Visit        Mental Health    Generalized anxiety disorder    Relevant Medications    citalopram (CeleXA) 40 MG tablet      Other Visit Diagnoses     Annual physical exam    -  Primary    Allergic asthma, mild intermittent, uncomplicated        Relevant Medications    montelukast (SINGULAIR) 10 MG tablet    Morbid obesity (HCC)        Body mass index (BMI) of 40.0 to 44.9 in adult (HCC)              · Health maintenance information provided with patient plan.   · Counseled on age appropriate health screenings. Will defer hcv screen until she has labs, maybe next year. Pap due 11/2022.  · Immunizations for age discussed, encouraged. Consider Pneumovax 23 in future due to asthma.  · Encourage healthy habits such as exercise, healthy diet.  Patient's Body mass index is 41.19 kg/m². indicating that she is morbidly obese (BMI > 40 or > 35 with obesity - related health condition). Obesity-related health conditions include the following: none. Obesity is improving with  lifestyle modifications. BMI is is above average; BMI management plan is completed. We discussed portion control and increasing exercise..  ·   · Return in about 1 year (around 2/7/2023) for Annual physical.     Kami Khan MD

## 2023-01-20 ENCOUNTER — OFFICE VISIT (OUTPATIENT)
Dept: OBSTETRICS AND GYNECOLOGY | Facility: CLINIC | Age: 33
End: 2023-01-20
Payer: COMMERCIAL

## 2023-01-20 VITALS
DIASTOLIC BLOOD PRESSURE: 100 MMHG | SYSTOLIC BLOOD PRESSURE: 140 MMHG | HEIGHT: 62 IN | WEIGHT: 236 LBS | BODY MASS INDEX: 43.43 KG/M2

## 2023-01-20 DIAGNOSIS — Z12.4 SCREENING FOR MALIGNANT NEOPLASM OF CERVIX: ICD-10-CM

## 2023-01-20 DIAGNOSIS — Z12.39 ENCOUNTER FOR BREAST CANCER SCREENING OTHER THAN MAMMOGRAM: ICD-10-CM

## 2023-01-20 DIAGNOSIS — Z01.419 ENCOUNTER FOR GYNECOLOGICAL EXAMINATION WITHOUT ABNORMAL FINDING: Primary | ICD-10-CM

## 2023-01-20 DIAGNOSIS — Z30.41 ENCOUNTER FOR SURVEILLANCE OF CONTRACEPTIVE PILLS: ICD-10-CM

## 2023-01-20 PROCEDURE — 99395 PREV VISIT EST AGE 18-39: CPT | Performed by: MIDWIFE

## 2023-01-20 RX ORDER — LEVONORGESTREL / ETHINYL ESTRADIOL AND ETHINYL ESTRADIOL 150-30(84)
1 KIT ORAL DAILY
Qty: 91 EACH | Refills: 4 | Status: SHIPPED | OUTPATIENT
Start: 2023-01-20 | End: 2024-01-19

## 2023-01-20 NOTE — PROGRESS NOTES
"Subjective   Chief Complaint   Patient presents with   • Gynecologic Exam     No Complaints/concerns      Shae Allen is a 32 y.o. year old  presenting to be seen for her annual exam.   She is doing well on Seasonique and wants to continue. She hasn't had any breakthrough bleeding.  States she was nervous today. That's why her BP was elevated.  Considering pregnancy    Past Medical History:   Diagnosis Date   • Abnormal Pap smear of cervix    • Allergic asthma    • Anxiety 2016    I've always struggled w/anxiety. On meds for 3 years   • Depression 2016    I have times where I feel depressed.  My anxiety meds help   • Eczema    • Pneumonia    • Seborrheic dermatitis       Past Surgical History:   Procedure Laterality Date   • MYRINGOTOMY W/ TUBES            The following portions of the patient's history were reviewed and updated as appropriate:problem list, current medications, allergies, past family history, past medical history, past social history and past surgical history.    Review of Systems   Constitutional: Negative.    Respiratory: Negative.    Cardiovascular: Negative.    Gastrointestinal: Negative.    Genitourinary: Negative.    Musculoskeletal: Negative.    Psychiatric/Behavioral: Negative.    All other systems reviewed and are negative.          Objective   /100, retake 114/86   Ht 157.5 cm (62\")   Wt 107 kg (236 lb)   BMI 43.16 kg/m²     Physical Exam   Constitutional   The patient is awake, alert, well developed, well nourished and well groomed.   Neck   The neck is supple and the trachea is midline. The thyroid is not enlarged and there are no palpable nodules.   Breast  Inspection of the breast reveals symmetrical and smooth breast bilaterally without skin color changes, lesions, dimpling or skin retraction.  The nipples are  everted and without discharge.  Palpation of the breast tissue is non-tender, smooth, with fibrocystic lumps.  The axillae are " without masses.   Respiratory  The patient is relaxed and breathes without effort. Lungs CTAB  Cardiovascular  Heart regular rate and rhythm without murmur.  Gastrointestinal   The abdomen is soft and non-tender.  No hepatosplenomegaly.  Genitourinary   - External Genitalia without erythema, lesions, or masses  -Urethral Meatus is without erythema, edema, prolapse or lesions.   -Bladder - Palpation at the region above the symphysis pubis             reveals no bladder tenderness.   -Vagina - There is no excessive vaginal discharge.   Vaginal walls reveals moist vaginal mucosa without inflammation or lesions    There is no evidence of pelvic relaxation; there is no cystocele or rectocele.  -Cervix  is smooth, pink, and without discharge. Negative cervical motion tenderness   Uterus - uterine body is of normal size, shape, & without tenderness  Adnexa structures are nontender and without masses  Perineum is without inflammation or lesions   Extremities  Full ROM. No cyanosis or edema   Skin  Normal in color, texture, moisture, temperature, mobility and turgor. There are no abnormal lesions.    Psychiatric  The patient is oriented to person, place, and time. Speech is fluent and words are clear          Assessment /Plan      Diagnoses and all orders for this visit:    1. Encounter for gynecological examination without abnormal finding (Primary)  Encouraged healthy eating and exercise  Preconceptual counseling- prenatal vitamin when attempting conception. Folic Acid supplement  -     Levonorgest-Eth Estrad 91-Day 0.15-0.03 &0.01 MG tablet; Take 1 tablet/day by mouth Daily for 364 days.  Dispense: 91 each; Refill: 4    2. Screening for malignant neoplasm of cervix  -     LIQUID-BASED PAP SMEAR, [facility] ([facility]); Future  -     LIQUID-BASED PAP SMEAR, [facility] ([facility])    3. Encounter for breast cancer screening other than mammogram  Encouraged self breast exam               This note was electronically  signed.    Taylor Carranza CNM   January 20, 2023

## 2023-01-24 LAB — REF LAB TEST METHOD: NORMAL

## 2023-02-13 ENCOUNTER — OFFICE VISIT (OUTPATIENT)
Dept: INTERNAL MEDICINE | Facility: CLINIC | Age: 33
End: 2023-02-13
Payer: COMMERCIAL

## 2023-02-13 VITALS
SYSTOLIC BLOOD PRESSURE: 122 MMHG | HEART RATE: 85 BPM | WEIGHT: 237.8 LBS | TEMPERATURE: 97.4 F | HEIGHT: 62 IN | BODY MASS INDEX: 43.76 KG/M2 | DIASTOLIC BLOOD PRESSURE: 82 MMHG | OXYGEN SATURATION: 98 % | RESPIRATION RATE: 16 BRPM

## 2023-02-13 DIAGNOSIS — E66.01 CLASS 3 SEVERE OBESITY DUE TO EXCESS CALORIES WITHOUT SERIOUS COMORBIDITY WITH BODY MASS INDEX (BMI) OF 40.0 TO 44.9 IN ADULT: ICD-10-CM

## 2023-02-13 DIAGNOSIS — J30.2 SEASONAL ALLERGIC RHINITIS, UNSPECIFIED TRIGGER: ICD-10-CM

## 2023-02-13 DIAGNOSIS — Z00.00 ANNUAL PHYSICAL EXAM: Primary | ICD-10-CM

## 2023-02-13 DIAGNOSIS — Z23 NEED FOR PNEUMOCOCCAL VACCINATION: ICD-10-CM

## 2023-02-13 DIAGNOSIS — D49.2 ATYPICAL SQUAMOPROLIFERATIVE SKIN LESION: ICD-10-CM

## 2023-02-13 DIAGNOSIS — Z51.81 MEDICATION MONITORING ENCOUNTER: ICD-10-CM

## 2023-02-13 DIAGNOSIS — Z11.59 NEED FOR HEPATITIS C SCREENING TEST: ICD-10-CM

## 2023-02-13 DIAGNOSIS — Z13.220 LIPID SCREENING: ICD-10-CM

## 2023-02-13 PROCEDURE — 90471 IMMUNIZATION ADMIN: CPT | Performed by: FAMILY MEDICINE

## 2023-02-13 PROCEDURE — 90677 PCV20 VACCINE IM: CPT | Performed by: FAMILY MEDICINE

## 2023-02-13 PROCEDURE — 99395 PREV VISIT EST AGE 18-39: CPT | Performed by: FAMILY MEDICINE

## 2023-02-13 RX ORDER — MOMETASONE FUROATE 50 UG/1
2 SPRAY, METERED NASAL DAILY
Qty: 17 G | Refills: 11 | Status: SHIPPED | OUTPATIENT
Start: 2023-02-13 | End: 2023-02-22 | Stop reason: SDUPTHER

## 2023-02-13 NOTE — PATIENT INSTRUCTIONS
Health Maintenance, Female  Adopting a healthy lifestyle and getting preventive care can go a long way to promote health and wellness. Talk with your health care provider about what schedule of regular examinations is right for you. This is a good chance for you to check in with your provider about disease prevention and staying healthy.  In between checkups, there are plenty of things you can do on your own. Experts have done a lot of research about which lifestyle changes and preventive measures are most likely to keep you healthy. Ask your health care provider for more information.  Weight and diet  Eat a healthy diet  Be sure to include plenty of vegetables, fruits, low-fat dairy products, and lean protein.  Do not eat a lot of foods high in solid fats, added sugars, or salt.  Get regular exercise. This is one of the most important things you can do for your health.  Most adults should exercise for at least 150 minutes each week. The exercise should increase your heart rate and make you sweat (moderate-intensity exercise).  Most adults should also do strengthening exercises at least twice a week. This is in addition to the moderate-intensity exercise.     Maintain a healthy weight  Body mass index (BMI) is a measurement that can be used to identify possible weight problems. It estimates body fat based on height and weight. Your health care provider can help determine your BMI and help you achieve or maintain a healthy weight.  For females 20 years of age and older:  A BMI below 18.5 is considered underweight.  A BMI of 18.5 to 24.9 is normal.  A BMI of 25 to 29.9 is considered overweight.  A BMI of 30 and above is considered obese.     Watch levels of cholesterol and blood lipids  You should start having your blood tested for lipids and cholesterol at 20 years of age, then have this test every 5 years.  You may need to have your cholesterol levels checked more often if:  Your lipid or cholesterol levels are  high.  You are older than 50 years of age.  You are at high risk for heart disease.     Cancer screening  Lung Cancer  Lung cancer screening is recommended for adults 55-80 years old who are at high risk for lung cancer because of a history of smoking.  A yearly low-dose CT scan of the lungs is recommended for people who:  Currently smoke.  Have quit within the past 15 years.  Have at least a 30-pack-year history of smoking. A pack year is smoking an average of one pack of cigarettes a day for 1 year.  Yearly screening should continue until it has been 15 years since you quit.  Yearly screening should stop if you develop a health problem that would prevent you from having lung cancer treatment.     Breast Cancer  Practice breast self-awareness. This means understanding how your breasts normally appear and feel.  It also means doing regular breast self-exams. Let your health care provider know about any changes, no matter how small.  If you are in your 20s or 30s, you should have a clinical breast exam (CBE) by a health care provider every 1-3 years as part of a regular health exam.  If you are 40 or older, have a CBE every year. Also consider having a breast X-ray (mammogram) every year.  If you have a family history of breast cancer, talk to your health care provider about genetic screening.  If you are at high risk for breast cancer, talk to your health care provider about having an MRI and a mammogram every year.  Breast cancer gene (BRCA) assessment is recommended for women who have family members with BRCA-related cancers. BRCA-related cancers include:  Breast.  Ovarian.  Tubal.  Peritoneal cancers.  Results of the assessment will determine the need for genetic counseling and BRCA1 and BRCA2 testing.     Cervical Cancer  Your health care provider may recommend that you be screened regularly for cancer of the pelvic organs (ovaries, uterus, and vagina). This screening involves a pelvic examination, including  checking for microscopic changes to the surface of your cervix (Pap test). You may be encouraged to have this screening done every 3 years, beginning at age 21.  For women ages 30-65, health care providers may recommend pelvic exams and Pap testing every 3 years, or they may recommend the Pap and pelvic exam, combined with testing for human papilloma virus (HPV), every 5 years. Some types of HPV increase your risk of cervical cancer. Testing for HPV may also be done on women of any age with unclear Pap test results.  Other health care providers may not recommend any screening for nonpregnant women who are considered low risk for pelvic cancer and who do not have symptoms. Ask your health care provider if a screening pelvic exam is right for you.  If you have had past treatment for cervical cancer or a condition that could lead to cancer, you need Pap tests and screening for cancer for at least 20 years after your treatment. If Pap tests have been discontinued, your risk factors (such as having a new sexual partner) need to be reassessed to determine if screening should resume. Some women have medical problems that increase the chance of getting cervical cancer. In these cases, your health care provider may recommend more frequent screening and Pap tests.     Colorectal Cancer  This type of cancer can be detected and often prevented.  Routine colorectal cancer screening usually begins at 50 years of age and continues through 75 years of age.  Your health care provider may recommend screening at an earlier age if you have risk factors for colon cancer.  Your health care provider may also recommend using home test kits to check for hidden blood in the stool.  A small camera at the end of a tube can be used to examine your colon directly (sigmoidoscopy or colonoscopy). This is done to check for the earliest forms of colorectal cancer.  Routine screening usually begins at age 50.  Direct examination of the colon should  be repeated every 5-10 years through 75 years of age. However, you may need to be screened more often if early forms of precancerous polyps or small growths are found.     Skin Cancer  Check your skin from head to toe regularly.  Tell your health care provider about any new moles or changes in moles, especially if there is a change in a mole's shape or color.  Also tell your health care provider if you have a mole that is larger than the size of a pencil eraser.  Always use sunscreen. Apply sunscreen liberally and repeatedly throughout the day.  Protect yourself by wearing long sleeves, pants, a wide-brimmed hat, and sunglasses whenever you are outside.     Heart disease, diabetes, and high blood pressure  High blood pressure causes heart disease and increases the risk of stroke. High blood pressure is more likely to develop in:  People who have blood pressure in the high end of the normal range (130-139/85-89 mm Hg).  People who are overweight or obese.  People who are .  If you are 18-39 years of age, have your blood pressure checked every 3-5 years. If you are 40 years of age or older, have your blood pressure checked every year. You should have your blood pressure measured twice--once when you are at a hospital or clinic, and once when you are not at a hospital or clinic. Record the average of the two measurements. To check your blood pressure when you are not at a hospital or clinic, you can use:  An automated blood pressure machine at a pharmacy.  A home blood pressure monitor.  If you are between 55 years and 79 years old, ask your health care provider if you should take aspirin to prevent strokes.  Have regular diabetes screenings. This involves taking a blood sample to check your fasting blood sugar level.  If you are at a normal weight and have a low risk for diabetes, have this test once every three years after 45 years of age.  If you are overweight and have a high risk for diabetes,  consider being tested at a younger age or more often.  Preventing infection  Hepatitis B  If you have a higher risk for hepatitis B, you should be screened for this virus. You are considered at high risk for hepatitis B if:  You were born in a country where hepatitis B is common. Ask your health care provider which countries are considered high risk.  Your parents were born in a high-risk country, and you have not been immunized against hepatitis B (hepatitis B vaccine).  You have HIV or AIDS.  You use needles to inject street drugs.  You live with someone who has hepatitis B.  You have had sex with someone who has hepatitis B.  You get hemodialysis treatment.  You take certain medicines for conditions, including cancer, organ transplantation, and autoimmune conditions.     Hepatitis C  Blood testing is recommended for:  Everyone born from 1945 through 1965.  Anyone with known risk factors for hepatitis C.     Sexually transmitted infections (STIs)  You should be screened for sexually transmitted infections (STIs) including gonorrhea and chlamydia if:  You are sexually active and are younger than 24 years of age.  You are older than 24 years of age and your health care provider tells you that you are at risk for this type of infection.  Your sexual activity has changed since you were last screened and you are at an increased risk for chlamydia or gonorrhea. Ask your health care provider if you are at risk.  If you do not have HIV, but are at risk, it may be recommended that you take a prescription medicine daily to prevent HIV infection. This is called pre-exposure prophylaxis (PrEP). You are considered at risk if:  You are sexually active and do not regularly use condoms or know the HIV status of your partner(s).  You take drugs by injection.  You are sexually active with a partner who has HIV.     Talk with your health care provider about whether you are at high risk of being infected with HIV. If you choose to  begin PrEP, you should first be tested for HIV. You should then be tested every 3 months for as long as you are taking PrEP.  Pregnancy  If you are premenopausal and you may become pregnant, ask your health care provider about preconception counseling.  If you may become pregnant, take 400 to 800 micrograms (mcg) of folic acid every day.  If you want to prevent pregnancy, talk to your health care provider about birth control (contraception).  Osteoporosis and menopause  Osteoporosis is a disease in which the bones lose minerals and strength with aging. This can result in serious bone fractures. Your risk for osteoporosis can be identified using a bone density scan.  If you are 65 years of age or older, or if you are at risk for osteoporosis and fractures, ask your health care provider if you should be screened.  Ask your health care provider whether you should take a calcium or vitamin D supplement to lower your risk for osteoporosis.  Menopause may have certain physical symptoms and risks.  Hormone replacement therapy may reduce some of these symptoms and risks.  Talk to your health care provider about whether hormone replacement therapy is right for you.  Follow these instructions at home:  Schedule regular health, dental, and eye exams.  Stay current with your immunizations.  Do not use any tobacco products including cigarettes, chewing tobacco, or electronic cigarettes.  If you are pregnant, do not drink alcohol.  If you are breastfeeding, limit how much and how often you drink alcohol.  Limit alcohol intake to no more than 1 drink per day for nonpregnant women. One drink equals 12 ounces of beer, 5 ounces of wine, or 1½ ounces of hard liquor.  Do not use street drugs.  Do not share needles.  Ask your health care provider for help if you need support or information about quitting drugs.  Tell your health care provider if you often feel depressed.  Tell your health care provider if you have ever been abused or do  not feel safe at home.  This information is not intended to replace advice given to you by your health care provider. Make sure you discuss any questions you have with your health care provider.  Document Released: 07/02/2012 Document Revised: 05/25/2017 Document Reviewed: 09/20/2016  [employer] Interactive Patient Education © 2018 [employer]       Quality 130: Documentation Of Current Medications In The Medical Record: Current Medications Documented Detail Level: Detailed

## 2023-02-13 NOTE — PROGRESS NOTES
"02/13/2023  Chief Complaint   Patient presents with   • Annual Exam   • Suspicious Skin Lesion     On right side abdomen, been there for several years, may have changed in texture        Patient Care Team:  Kami Khan MD as PCP - General (Family Medicine)]       Shae Allen is here for her annual preventive exam. History per MA reviewed.       Shae Allen has the following medical issues:  Patient Active Problem List    Diagnosis    • Allergic asthma [J45.909]    • Generalized anxiety disorder [F41.1]    • Eczema [L30.9]        Health Maintenance   Topic Date Due   • Pneumococcal Vaccine 0-64 (1 - PCV) Never done   • HEPATITIS C SCREENING  Never done   • ANNUAL PHYSICAL  02/07/2023   • INFLUENZA VACCINE  03/31/2023 (Originally 8/1/2022)   • COVID-19 Vaccine (4 - Booster for Pfizer series) 02/29/2024 (Originally 1/11/2022)   • PAP SMEAR  01/20/2026   • TDAP/TD VACCINES (2 - Td or Tdap) 08/05/2026       Immunization History   Administered Date(s) Administered   • COVID-19 (PFIZER) PURPLE CAP 03/02/2021, 03/23/2021, 11/16/2021   • Flu Vaccine Quad PF >36MO 11/16/2021   • Fluzone Quad >6mos (Multi-dose) 11/20/2017   • Influenza Quad Vaccine (Inpatient) 11/20/2017   • Influenza, Unspecified 11/07/2018   • Meningococcal Polysaccharide 03/26/2007   • Tdap 08/05/2016   • flucelvax quad pfs =>4 YRS 11/07/2018         The following portions of the patient's history were reviewed and updated as appropriate: allergies, current medications, past family history, past medical history, past social history, past surgical history and problem list.    Objective   Visit Vitals  /82 (BP Location: Right arm, Patient Position: Sitting, Cuff Size: Adult)   Pulse 85   Temp 97.4 °F (36.3 °C) (Temporal)   Resp 16   Ht 157.5 cm (62\")   Wt 108 kg (237 lb 12.8 oz)   SpO2 98%   BMI 43.49 kg/m²        Physical Exam  Vitals and nursing note reviewed.   Constitutional:       General: She is not in acute " distress.     Appearance: Normal appearance. She is well-developed and well-groomed. She is morbidly obese. She is not ill-appearing, toxic-appearing or diaphoretic.      Interventions: Face mask in place.   HENT:      Head: Normocephalic and atraumatic. Hair is normal.      Right Ear: Hearing, tympanic membrane, ear canal and external ear normal.      Left Ear: Hearing, tympanic membrane, ear canal and external ear normal.   Eyes:      General: Lids are normal. Gaze aligned appropriately. No scleral icterus.        Right eye: No discharge.         Left eye: No discharge.      Extraocular Movements: Extraocular movements intact.      Conjunctiva/sclera: Conjunctivae normal.      Pupils: Pupils are equal, round, and reactive to light.   Neck:      Thyroid: No thyromegaly.      Trachea: Trachea and phonation normal. No tracheal deviation.   Cardiovascular:      Rate and Rhythm: Normal rate and regular rhythm.      Heart sounds: Normal heart sounds. No murmur heard.    No friction rub. No gallop.   Pulmonary:      Effort: Pulmonary effort is normal.      Breath sounds: Normal breath sounds and air entry.   Abdominal:      General: Bowel sounds are normal. There is no distension.      Palpations: Abdomen is soft. Abdomen is not rigid. There is no mass.      Tenderness: There is no abdominal tenderness. There is no guarding or rebound.       Musculoskeletal:         General: No tenderness or deformity.      Cervical back: Neck supple.      Right lower leg: No edema.      Left lower leg: No edema.   Skin:     General: Skin is warm.      Capillary Refill: Capillary refill takes less than 2 seconds.      Coloration: Skin is not cyanotic, jaundiced or pale.      Findings: No erythema or rash.      Nails: There is no clubbing.   Neurological:      General: No focal deficit present.      Mental Status: She is alert and oriented to person, place, and time.      Cranial Nerves: No cranial nerve deficit.      Motor: No tremor,  atrophy, abnormal muscle tone or seizure activity.      Gait: Gait is intact. Gait normal.   Psychiatric:         Attention and Perception: Attention and perception normal.         Mood and Affect: Mood and affect normal.         Speech: Speech normal.         Behavior: Behavior normal. Behavior is cooperative.         Thought Content: Thought content normal.         Cognition and Memory: Cognition and memory normal.         Judgment: Judgment normal.         No results found for: CHLPL, TRIG, HDL, LDL, LDLDIRECT  Lab Results   Component Value Date    TSH 2.470 02/19/2018     No results found for: FREET4  No results found for: HGBA1C    Assessment   Diagnoses and all orders for this visit:    1. Annual physical exam (Primary)  -     Hemoglobin A1c  -     Lipid Panel  -     TSH+Free T4  -     Vitamin D,25-Hydroxy  -     Vitamin B12 Deficiency Cascade  -     CBC (No Diff)  -     Comprehensive Metabolic Panel  -     Folate  -     Vitamin B6    2. Atypical squamoproliferative skin lesion  Comments:  we will have her return on 2/17 for lesion excision via shave    3. Need for pneumococcal vaccination  Comments:  due to asthma, higher risk  Orders:  -     Pneumococcal Conjugate Vaccine 20-Valent All    4. Class 3 severe obesity due to excess calories without serious comorbidity with body mass index (BMI) of 40.0 to 44.9 in adult (HCC)  -     Hemoglobin A1c  -     Lipid Panel  -     TSH+Free T4  -     Vitamin D,25-Hydroxy    5. Seasonal allergic rhinitis, unspecified trigger  -     mometasone (Nasonex) 50 MCG/ACT nasal spray; 2 sprays into the nostril(s) as directed by provider Daily.  Dispense: 17 g; Refill: 11    6. Lipid screening  -     Lipid Panel    7. Need for hepatitis C screening test  -     Hepatitis C Antibody    8. Medication monitoring encounter  Comments:  vitamin B6 deficiency associated with oral contraceptive use  Orders:  -     Hemoglobin A1c  -     Lipid Panel  -     TSH+Free T4  -     Vitamin  D,25-Hydroxy  -     Vitamin B12 Deficiency Cascade  -     CBC (No Diff)  -     Comprehensive Metabolic Panel  -     Folate  -     Vitamin B6         · Health maintenance information provided via patient plan (after visit summary).   · Counseled on age appropriate health screenings.  · Immunizations for age discussed, encouraged.   · Encouraged healthy habits such as exercise, healthy diet.  Class 3 Severe Obesity (BMI >=40). Obesity-related health conditions include the following: none. Obesity is unchanged. BMI is is above average; BMI management plan is completed. We discussed info via avs.  ·   ·   · Return in 4 days (on 2/17/2023) for as scheduled for skin lesion excision via shave.     Kami Khan MD

## 2023-02-16 DIAGNOSIS — J45.20 ALLERGIC ASTHMA, MILD INTERMITTENT, UNCOMPLICATED: ICD-10-CM

## 2023-02-16 RX ORDER — MONTELUKAST SODIUM 10 MG/1
TABLET ORAL
Qty: 90 TABLET | Refills: 3 | Status: SHIPPED | OUTPATIENT
Start: 2023-02-16

## 2023-02-16 NOTE — TELEPHONE ENCOUNTER
Rx Refill Note  Requested Prescriptions     Pending Prescriptions Disp Refills   • montelukast (SINGULAIR) 10 MG tablet [Pharmacy Med Name: MONTELUKAST SOD 10 MG TABLET] 90 tablet 3     Sig: TAKE ONE TABLET BY MOUTH EVERY EVENING      Last office visit with prescribing clinician: 2/13/2023   Last telemedicine visit with prescribing clinician:   Next office visit with prescribing clinician: 2/17/2023       Isela Collins MA  02/16/23, 15:16 EST

## 2023-02-17 ENCOUNTER — PROCEDURE VISIT (OUTPATIENT)
Dept: INTERNAL MEDICINE | Facility: CLINIC | Age: 33
End: 2023-02-17
Payer: COMMERCIAL

## 2023-02-17 VITALS
WEIGHT: 237 LBS | HEIGHT: 62 IN | BODY MASS INDEX: 43.61 KG/M2 | OXYGEN SATURATION: 97 % | RESPIRATION RATE: 16 BRPM | DIASTOLIC BLOOD PRESSURE: 82 MMHG | HEART RATE: 82 BPM | TEMPERATURE: 97.1 F | SYSTOLIC BLOOD PRESSURE: 122 MMHG

## 2023-02-17 DIAGNOSIS — D49.2 ATYPICAL SQUAMOPROLIFERATIVE SKIN LESION: Primary | ICD-10-CM

## 2023-02-17 LAB
25(OH)D3+25(OH)D2 SERPL-MCNC: 49.3 NG/ML (ref 30–100)
ALBUMIN SERPL-MCNC: 4.2 G/DL (ref 3.8–4.8)
ALBUMIN/GLOB SERPL: 1.6 {RATIO} (ref 1.2–2.2)
ALP SERPL-CCNC: 87 IU/L (ref 44–121)
ALT SERPL-CCNC: 25 IU/L (ref 0–32)
AST SERPL-CCNC: 21 IU/L (ref 0–40)
BILIRUB SERPL-MCNC: 0.2 MG/DL (ref 0–1.2)
BUN SERPL-MCNC: 9 MG/DL (ref 6–20)
BUN/CREAT SERPL: 13 (ref 9–23)
CALCIUM SERPL-MCNC: 9.7 MG/DL (ref 8.7–10.2)
CHLORIDE SERPL-SCNC: 103 MMOL/L (ref 96–106)
CHOLEST SERPL-MCNC: 197 MG/DL (ref 100–199)
CO2 SERPL-SCNC: 24 MMOL/L (ref 20–29)
CREAT SERPL-MCNC: 0.67 MG/DL (ref 0.57–1)
EGFRCR SERPLBLD CKD-EPI 2021: 118 ML/MIN/1.73
ERYTHROCYTE [DISTWIDTH] IN BLOOD BY AUTOMATED COUNT: 12.6 % (ref 11.7–15.4)
FOLATE SERPL-MCNC: >20 NG/ML
GLOBULIN SER CALC-MCNC: 2.7 G/DL (ref 1.5–4.5)
GLUCOSE SERPL-MCNC: 78 MG/DL (ref 70–99)
HBA1C MFR BLD: 5.4 % (ref 4.8–5.6)
HCT VFR BLD AUTO: 40.8 % (ref 34–46.6)
HCV IGG SERPL QL IA: NON REACTIVE
HDLC SERPL-MCNC: 45 MG/DL
HGB BLD-MCNC: 13.6 G/DL (ref 11.1–15.9)
INTERPRETATION: NORMAL
LDLC SERPL CALC-MCNC: 132 MG/DL (ref 0–99)
MCH RBC QN AUTO: 28.6 PG (ref 26.6–33)
MCHC RBC AUTO-ENTMCNC: 33.3 G/DL (ref 31.5–35.7)
MCV RBC AUTO: 86 FL (ref 79–97)
PLATELET # BLD AUTO: 274 X10E3/UL (ref 150–450)
POTASSIUM SERPL-SCNC: 4 MMOL/L (ref 3.5–5.2)
PROT SERPL-MCNC: 6.9 G/DL (ref 6–8.5)
PYRIDOXAL PHOS SERPL-MCNC: 23.5 UG/L (ref 3.4–65.2)
RBC # BLD AUTO: 4.76 X10E6/UL (ref 3.77–5.28)
SODIUM SERPL-SCNC: 142 MMOL/L (ref 134–144)
T4 FREE SERPL-MCNC: 1.22 NG/DL (ref 0.82–1.77)
TRIGL SERPL-MCNC: 112 MG/DL (ref 0–149)
TSH SERPL DL<=0.005 MIU/L-ACNC: 3.33 UIU/ML (ref 0.45–4.5)
VIT B12 SERPL-MCNC: 520 PG/ML (ref 232–1245)
VLDLC SERPL CALC-MCNC: 20 MG/DL (ref 5–40)
WBC # BLD AUTO: 6.7 X10E3/UL (ref 3.4–10.8)

## 2023-02-17 PROCEDURE — 11301 SHAVE SKIN LESION 0.6-1.0 CM: CPT | Performed by: FAMILY MEDICINE

## 2023-02-17 RX ORDER — LIDOCAINE HYDROCHLORIDE AND EPINEPHRINE BITARTRATE 20; .01 MG/ML; MG/ML
10 INJECTION, SOLUTION SUBCUTANEOUS ONCE
Status: COMPLETED | OUTPATIENT
Start: 2023-02-17 | End: 2023-02-17

## 2023-02-17 RX ADMIN — LIDOCAINE HYDROCHLORIDE AND EPINEPHRINE BITARTRATE 10 ML: 20; .01 INJECTION, SOLUTION SUBCUTANEOUS at 16:50

## 2023-02-17 NOTE — PATIENT INSTRUCTIONS
Skin Biopsy, Care After  The following information offers guidance on how to care for yourself after your procedure. Your health care provider may also give you more specific instructions. If you have problems or questions, contact your health care provider.  What can I expect after the procedure?  After the procedure, it is common to have:  Soreness or mild pain.  Bruising.  Itching.  Some redness and swelling.  Follow these instructions at home:  Biopsy site care    Follow instructions from your health care provider about how to take care of your biopsy site. Make sure you:  Wash your hands with soap and water for at least 20 seconds before and after you change your bandage (dressing). If soap and water are not available, use hand .  Change your dressing as told by your health care provider.  Leave stitches (sutures), skin glue, or adhesive strips in place. These skin closures may need to stay in place for 2 weeks or longer. If adhesive strip edges start to loosen and curl up, you may trim the loose edges. Do not remove adhesive strips completely unless your health care provider tells you to do that.  Check your biopsy site every day for signs of infection. Check for:  More redness, swelling, or pain.  Fluid or blood.  Warmth.  Pus or a bad smell.  Do not take baths, swim, or use a hot tub until your health care provider approves. Ask your health care provider if you may take showers. You may only be allowed to take sponge baths.  General instructions  Take over-the-counter and prescription medicines only as told by your health care provider.  Return to your normal activities as told by your health care provider. Ask your health care provider what activities are safe for you.  Keep all follow-up visits. This is important.  Contact a health care provider if:  You have more redness, swelling, or pain around your biopsy site.  You have fluid or blood coming from your biopsy site.  Your biopsy site feels warm  to the touch.  You have pus or a bad smell coming from your biopsy site.  You have a fever.  Your sutures, skin glue, or adhesive strips loosen or come off sooner than expected.  Get help right away if:  You have bleeding that does not stop with pressure or a dressing.  Summary  After the procedure, it is common to have soreness, bruising, and itching at the site.  Follow instructions from your health care provider about how to take care of your biopsy site.  Check your biopsy site every day for signs of infection.  Contact a health care provider if you have more redness, swelling, or pain around your biopsy site, or your biopsy site feels warm to the touch.  Keep all follow-up visits. This is important.  This information is not intended to replace advice given to you by your health care provider. Make sure you discuss any questions you have with your health care provider.  Document Revised: 07/19/2022 Document Reviewed: 07/19/2022  [employer] Patient Education © 2022 [employer]

## 2023-02-17 NOTE — PROGRESS NOTES
"Chief Complaint  Suspicious Skin Lesion (Mole removal procedure )    Subjective        Shae Allen presents to [facility]  History of Present Illness  Here for skin lesion removal. Lesion irritated easily. Changing over time.      Objective   Vital Signs:  /82 (BP Location: Right arm, Patient Position: Sitting, Cuff Size: Adult)   Pulse 82   Temp 97.1 °F (36.2 °C) (Temporal)   Resp 16   Ht 157.5 cm (62\")   Wt 108 kg (237 lb)   SpO2 97%   BMI 43.35 kg/m²   Estimated body mass index is 43.35 kg/m² as calculated from the following:    Height as of this encounter: 157.5 cm (62\").    Weight as of this encounter: 108 kg (237 lb).             Physical Exam  Vitals and nursing note reviewed.   Skin:     Findings: Lesion (6 mm x 7 mm erythematous scabbed plaque on right abdominal wall see photos) present.        CLINICAL PHOTOGRAPHS OTHER - Abdominal lesion (02/17/2023)  CLINICAL PHOTOGRAPHS OTHER - Abdominal wall lesion (02/17/2023)    Result Review :            Biopsy    Date/Time: 2/17/2023 4:45 PM  Performed by: Kami Khan MD  Authorized by: Kami Khan MD     Procedure Details - Skin Biopsy:     Body area: trunk    Trunk location: abdomen    Initial size (mm): 7    Final defect size (mm): 8    Malignancy: malignancy unknown      Destruction method: shave biopsy      Comments:   EBL minimal. Bandage applied after hemostasis achieved. Signs of secondary infection discussed, follow up for any issues. Lesion sent for pathology.          Assessment and Plan   Diagnoses and all orders for this visit:    1. Atypical squamoproliferative skin lesion (Primary)  -     lidocaine-EPINEPHrine (XYLOCAINE W/EPI) 2 %-1:146331 injection 10 mL  -     Biopsy  -     NON-GYN CYTOLOGY, [facility] ([facility]); Future             Follow Up   Return for As scheduled previously.  Patient was given instructions and counseling regarding her condition or for health " maintenance advice. Please see specific information pulled into the AVS if appropriate.

## 2023-02-20 LAB — REF LAB TEST METHOD: NORMAL

## 2023-02-22 ENCOUNTER — PRIOR AUTHORIZATION (OUTPATIENT)
Dept: INTERNAL MEDICINE | Facility: CLINIC | Age: 33
End: 2023-02-22
Payer: COMMERCIAL

## 2023-02-22 DIAGNOSIS — J30.2 SEASONAL ALLERGIC RHINITIS, UNSPECIFIED TRIGGER: ICD-10-CM

## 2023-02-22 RX ORDER — MOMETASONE FUROATE 50 UG/1
2 SPRAY, METERED NASAL DAILY
Qty: 17 G | Refills: 11 | Status: SHIPPED | OUTPATIENT
Start: 2023-02-22

## 2023-02-22 NOTE — TELEPHONE ENCOUNTER
PA started via CoverMyMeds on [employer]. Key: [id number]    Status: Approved, Coverage Starts on: 2/22/2023 12:00:00 AM, Coverage Ends on: 2/22/2024 12:00:00 AM.

## 2023-04-11 DIAGNOSIS — F41.1 GENERALIZED ANXIETY DISORDER: ICD-10-CM

## 2023-04-13 RX ORDER — CITALOPRAM 40 MG/1
TABLET ORAL
Qty: 90 TABLET | Refills: 3 | Status: SHIPPED | OUTPATIENT
Start: 2023-04-13

## 2023-04-13 NOTE — TELEPHONE ENCOUNTER
Rx Refill Note  Requested Prescriptions     Pending Prescriptions Disp Refills   • citalopram (CeleXA) 40 MG tablet [Pharmacy Med Name: CITALOPRAM HBR 40 MG TABLET] 90 tablet 3     Sig: TAKE ONE TABLET BY MOUTH DAILY      Last office visit with prescribing clinician: 2/13/2023   Next office visit with prescribing clinician: 2/19/2024   {    Isela Collins MA  04/13/23, 14:10 EDT

## 2023-11-04 NOTE — PROGRESS NOTES
"Joycelyn Allen is a 30 y.o. female here for:  Chief Complaint   Patient presents with   • Anxiety     Celexa is working well and she would like refills today. She denies any problems.        History per MA reviewed.    Went w/o Singulair for a bit and felt breathing worsen  Doesn't need refill on albuterol at this time  Celexa still helping anxiety  Working on weight with partner, he's trying to lose as well         The following portions of the patient's history were reviewed and updated as appropriate: allergies, current medications, past family history, past medical history, past social history, past surgical history and problem list.    Review of Systems   Constitutional: Negative for fever.       Visit Vitals  /70   Pulse 73   Temp 97.8 °F (36.6 °C) (Temporal)   Resp 18   Ht 157.5 cm (62.01\")   Wt 98.9 kg (218 lb 2 oz)   SpO2 98%   BMI 39.89 kg/m²         Objective   Physical Exam  Vitals signs and nursing note reviewed.   Constitutional:       General: She is not in acute distress.     Appearance: Normal appearance. She is well-developed and well-groomed. She is obese. She is not ill-appearing, toxic-appearing or diaphoretic.      Interventions: Face mask in place.   HENT:      Head: Normocephalic and atraumatic.      Right Ear: Hearing normal.      Left Ear: Hearing normal.   Eyes:      General: Lids are normal. No scleral icterus.     Extraocular Movements: Extraocular movements intact.   Neck:      Trachea: Phonation normal.   Cardiovascular:      Rate and Rhythm: Normal rate and regular rhythm.   Pulmonary:      Effort: Pulmonary effort is normal.   Skin:     Coloration: Skin is not jaundiced.   Neurological:      General: No focal deficit present.      Mental Status: She is alert and oriented to person, place, and time.      Motor: Motor function is intact.   Psychiatric:         Attention and Perception: Attention and perception normal.         Mood and Affect: Mood and affect " normal.         Speech: Speech normal.         Behavior: Behavior normal. Behavior is cooperative.         Thought Content: Thought content normal.         Cognition and Memory: Cognition and memory normal.         Judgment: Judgment normal.           Assessment/Plan     Problem List Items Addressed This Visit        Mental Health    Generalized anxiety disorder - Primary    Relevant Medications    citalopram (CeleXA) 40 MG tablet      Other Visit Diagnoses     Allergic asthma, mild intermittent, uncomplicated        Relevant Medications    montelukast (SINGULAIR) 10 MG tablet    Class 2 obesity due to excess calories without serious comorbidity with body mass index (BMI) of 39.0 to 39.9 in adult        Weight loss counseling, encounter for              Patient's Body mass index is 39.89 kg/m². BMI is above normal parameters. Recommendations include: exercise counseling and nutrition counseling.     Return in about 1 year (around 2/5/2022) for Annual physical.     Kami Khan MD   No

## 2024-02-08 ENCOUNTER — PATIENT MESSAGE (OUTPATIENT)
Dept: INTERNAL MEDICINE | Facility: CLINIC | Age: 34
End: 2024-02-08
Payer: COMMERCIAL

## 2024-02-08 NOTE — TELEPHONE ENCOUNTER
From: Shae Riverofett  To: Kami Khan  Sent: 2/8/2024 7:58 AM EST  Subject: Medications     Hi Dr. Khan, I just found out I am pregnant and was wondering if the medications I'm on are still safe to be taking? The generics for Celexa and Singulair.

## 2024-02-10 DIAGNOSIS — J45.20 ALLERGIC ASTHMA, MILD INTERMITTENT, UNCOMPLICATED: ICD-10-CM

## 2024-02-10 RX ORDER — MONTELUKAST SODIUM 10 MG/1
TABLET ORAL
Qty: 90 TABLET | Refills: 3 | Status: SHIPPED | OUTPATIENT
Start: 2024-02-10

## 2024-02-29 NOTE — PATIENT INSTRUCTIONS
Health Maintenance, Female    Adopting a healthy lifestyle and getting preventive care are important in promoting health and wellness. Ask your health care provider about:  The right schedule for you to have regular tests and exams.  Things you can do on your own to prevent diseases and keep yourself healthy.    What should I know about diet, weight, and exercise?  Eat a healthy diet    Eat a diet that includes plenty of vegetables, fruits, low-fat dairy products, and lean protein.  Do not eat a lot of foods that are high in solid fats, added sugars, or sodium.    Maintain a healthy weight  Body mass index (BMI) is used to identify weight problems. It estimates body fat based on height and weight. Your health care provider can help determine your BMI and help you achieve or maintain a healthy weight.  Get regular exercise  Get regular exercise. This is one of the most important things you can do for your health. Most adults should:  Exercise for at least 150 minutes each week. The exercise should increase your heart rate and make you sweat (moderate-intensity exercise).  Do strengthening exercises at least twice a week. This is in addition to the moderate-intensity exercise.  Spend less time sitting. Even light physical activity can be beneficial.  Watch cholesterol and blood lipids  Have your blood tested for lipids and cholesterol at 20 years of age, then have this test every 5 years.  Have your cholesterol levels checked more often if:  Your lipid or cholesterol levels are high.  You are older than 40 years of age.  You are at high risk for heart disease.    What should I know about cancer screening?  Depending on your health history and family history, you may need to have cancer screening at various ages. This may include screening for:  Breast cancer.  Cervical cancer.  Colorectal cancer.  Skin cancer.  Lung cancer.    What should I know about heart disease, diabetes, and high blood pressure?  Blood pressure  and heart disease  High blood pressure causes heart disease and increases the risk of stroke. This is more likely to develop in people who have high blood pressure readings or are overweight.  Have your blood pressure checked:  Every 3-5 years if you are 18-39 years of age.  Every year if you are 40 years old or older.  Diabetes  Have regular diabetes screenings. This checks your fasting blood sugar level. Have the screening done:  Once every three years after age 40 if you are at a normal weight and have a low risk for diabetes.  More often and at a younger age if you are overweight or have a high risk for diabetes.    What should I know about preventing infection?  Hepatitis B  If you have a higher risk for hepatitis B, you should be screened for this virus. Talk with your health care provider to find out if you are at risk for hepatitis B infection.  Hepatitis C  Testing is recommended for:  All adults aged 18 to 79 years  Anyone with known risk factors for hepatitis C.  Sexually transmitted infections (STIs)  Get screened for STIs, including gonorrhea and chlamydia, if:  You are sexually active and are younger than 24 years of age.  You are older than 24 years of age and your health care provider tells you that you are at risk for this type of infection.  Your sexual activity has changed since you were last screened, and you are at increased risk for chlamydia or gonorrhea. Ask your health care provider if you are at risk.  Ask your health care provider about whether you are at high risk for HIV. Your health care provider may recommend a prescription medicine to help prevent HIV infection. If you choose to take medicine to prevent HIV, you should first get tested for HIV. You should then be tested every 3 months for as long as you are taking the medicine.    Pregnancy  If you are about to stop having your period (premenopausal) and you may become pregnant, seek counseling before you get pregnant.  Take 400 to  800 micrograms (mcg) of folic acid every day if you become pregnant.  Ask for birth control (contraception) if you want to prevent pregnancy.    Osteoporosis and menopause  Osteoporosis is a disease in which the bones lose minerals and strength with aging. This can result in bone fractures. If you are 65 years old or older, or if you are at risk for osteoporosis and fractures, ask your health care provider if you should:  Be screened for bone loss.  Take a calcium or vitamin D supplement to lower your risk of fractures.  Be given hormone replacement therapy (HRT) to treat symptoms of menopause.    Follow these instructions at home:  Alcohol use  Do not drink alcohol if:  Your health care provider tells you not to drink.  You are pregnant, may be pregnant, or are planning to become pregnant.  If you drink alcohol:  Limit how much you have to:  0-1 drink a day.  Know how much alcohol is in your drink. In the [place] one drink equals one 12 oz bottle of beer (355 mL), one 5 oz glass of wine (148 mL), or one 1½ oz glass of hard liquor (44 mL).  Lifestyle  Do not use any products that contain nicotine or tobacco. These products include cigarettes, chewing tobacco, and vaping devices, such as e-cigarettes. If you need help quitting, ask your health care provider.  Do not use street drugs.  Do not share needles.  Ask your health care provider for help if you need support or information about quitting drugs.    General instructions  Schedule regular health, dental, and eye exams.  Stay current with your vaccines.  Tell your health care provider if:  You often feel depressed.  You have ever been abused or do not feel safe at home.    Summary  Adopting a healthy lifestyle and getting preventive care are important in promoting health and wellness.  Follow your health care provider's instructions about healthy diet, exercising, and getting tested or screened for diseases.  Follow your health care provider's instructions on  monitoring your cholesterol and blood pressure.    This information is not intended to replace advice given to you by your health care provider. Make sure you discuss any questions you have with your health care provider.  Document Revised: 05/09/2022 Document Reviewed: 05/09/2022  [employer]vier Patient Education © 2023 [employer].  Updated 2/29/24 TC

## 2024-03-01 ENCOUNTER — OFFICE VISIT (OUTPATIENT)
Dept: INTERNAL MEDICINE | Facility: CLINIC | Age: 34
End: 2024-03-01
Payer: COMMERCIAL

## 2024-03-01 VITALS
RESPIRATION RATE: 16 BRPM | OXYGEN SATURATION: 99 % | TEMPERATURE: 97 F | BODY MASS INDEX: 42.1 KG/M2 | SYSTOLIC BLOOD PRESSURE: 124 MMHG | WEIGHT: 228.8 LBS | DIASTOLIC BLOOD PRESSURE: 80 MMHG | HEIGHT: 62 IN | HEART RATE: 78 BPM

## 2024-03-01 DIAGNOSIS — Z00.00 ANNUAL PHYSICAL EXAM: Primary | ICD-10-CM

## 2024-03-01 DIAGNOSIS — Z34.91 FIRST TRIMESTER PREGNANCY: ICD-10-CM

## 2024-03-01 DIAGNOSIS — F41.1 GENERALIZED ANXIETY DISORDER: ICD-10-CM

## 2024-03-01 DIAGNOSIS — E66.01 CLASS 3 SEVERE OBESITY DUE TO EXCESS CALORIES WITHOUT SERIOUS COMORBIDITY WITH BODY MASS INDEX (BMI) OF 40.0 TO 44.9 IN ADULT: ICD-10-CM

## 2024-03-01 RX ORDER — CITALOPRAM 40 MG/1
40 TABLET ORAL DAILY
Qty: 90 TABLET | Refills: 3 | Status: SHIPPED | OUTPATIENT
Start: 2024-03-01

## 2024-03-01 NOTE — PROGRESS NOTES
"03/01/2024  Chief Complaint   Patient presents with    Annual Exam       Patient Care Team:  Kami Khan MD as PCP - General (Family Medicine)]       Shae Allen is here for her annual preventive exam. History per MA reviewed.     Pregnant, first pregnancy. Scheduled to see ob/gyn in [place]. Last normal period started 12/18/23. She had some light bleeding in January but not a normal period. Off oral contraceptive pill since approx Sept. Taking prenatal. Some nausea but not terrible.    Health Maintenance   Topic Date Due    ANNUAL PHYSICAL  02/13/2024    INFLUENZA VACCINE  03/31/2024 (Originally 8/1/2023)    COVID-19 Vaccine (4 - 2023-24 season) 05/20/2024 (Originally 9/1/2023)    BMI FOLLOWUP  03/01/2025    PAP SMEAR  01/20/2026    TDAP/TD VACCINES (2 - Td or Tdap) 08/05/2026    HEPATITIS C SCREENING  Completed    Pneumococcal Vaccine 0-64  Completed       Immunization History   Administered Date(s) Administered    COVID-19 (PFIZER) Purple Cap Monovalent 03/02/2021, 03/23/2021, 11/16/2021    Flu Vaccine Quad PF >36MO 11/16/2021    Fluzone Quad >6mos (Multi-dose) 11/20/2017    Influenza Quad Vaccine (Inpatient) 11/20/2017    Influenza, Unspecified 11/07/2018    Meningococcal Polysaccharide 03/26/2007    Pneumococcal Conjugate 20-Valent (PCV20) 02/13/2023    Tdap 08/05/2016    flucelvax quad pfs =>4 YRS 11/07/2018         The following portions of the patient's history were reviewed and updated as appropriate: allergies, current medications, past family history, past medical history, past social history, past surgical history and problem list.    Objective   Visit Vitals  /80 (BP Location: Left arm, Patient Position: Sitting, Cuff Size: Adult)   Pulse 78   Temp 97 °F (36.1 °C) (Temporal)   Resp 16   Ht 157.5 cm (62\")   Wt 104 kg (228 lb 12.8 oz)   SpO2 99%   BMI 41.85 kg/m²              Physical Exam  Vitals and nursing note reviewed.   Constitutional:       General: She is not in " acute distress.     Appearance: Normal appearance. She is well-developed and well-groomed. She is morbidly obese. She is not ill-appearing, toxic-appearing or diaphoretic.   HENT:      Head: Normocephalic and atraumatic. Hair is normal.      Right Ear: Hearing, tympanic membrane, ear canal and external ear normal.      Left Ear: Hearing, tympanic membrane, ear canal and external ear normal.      Nose: Nose normal.      Mouth/Throat:      Mouth: Mucous membranes are moist.   Eyes:      General: Lids are normal. Gaze aligned appropriately. No scleral icterus.        Right eye: No discharge.         Left eye: No discharge.      Extraocular Movements: Extraocular movements intact.      Conjunctiva/sclera: Conjunctivae normal.      Pupils: Pupils are equal, round, and reactive to light.   Neck:      Thyroid: No thyromegaly.      Trachea: Trachea and phonation normal. No tracheal deviation.   Cardiovascular:      Rate and Rhythm: Normal rate and regular rhythm.      Heart sounds: Normal heart sounds. No murmur heard.     No friction rub. No gallop.   Pulmonary:      Effort: Pulmonary effort is normal.      Breath sounds: Normal breath sounds and air entry.   Abdominal:      General: Bowel sounds are normal. There is no distension.      Palpations: Abdomen is soft. Abdomen is not rigid. There is no mass.      Tenderness: There is no abdominal tenderness. There is no guarding or rebound.   Musculoskeletal:         General: No tenderness or deformity.      Cervical back: Neck supple.      Right lower leg: No edema.      Left lower leg: No edema.   Skin:     General: Skin is warm.      Capillary Refill: Capillary refill takes less than 2 seconds.      Coloration: Skin is not cyanotic, jaundiced or pale.      Findings: No erythema or rash.      Nails: There is no clubbing.   Neurological:      General: No focal deficit present.      Mental Status: She is alert and oriented to person, place, and time.      Cranial Nerves: No  cranial nerve deficit.      Motor: No tremor, atrophy, abnormal muscle tone or seizure activity.      Gait: Gait is intact. Gait normal.   Psychiatric:         Attention and Perception: Attention and perception normal.         Mood and Affect: Mood and affect normal.         Speech: Speech normal.         Behavior: Behavior normal. Behavior is cooperative.         Thought Content: Thought content normal.         Cognition and Memory: Cognition and memory normal.         Judgment: Judgment normal.         Lab Results   Component Value Date    CHLPL 197 02/13/2023    TRIG 112 02/13/2023    HDL 45 02/13/2023     (H) 02/13/2023     Lab Results   Component Value Date    TSH 3.330 02/13/2023     Lab Results   Component Value Date    FREET4 1.22 02/13/2023     Lab Results   Component Value Date    HGBA1C 5.4 02/13/2023       Assessment   Diagnoses and all orders for this visit:    1. Annual physical exam (Primary)    2. First trimester pregnancy  Comments:  scheduled to see ob/gyn in [place]. possibly 10 weeks 4 days (based on last known normal period 12/18/23)    3. Generalized anxiety disorder  Comments:  discuss SSRI use with ob/gyn, risk of PPH when used in 3rd trimester vs risk of postpartum depression  Orders:  -     citalopram (CeleXA) 40 MG tablet; Take 1 tablet by mouth Daily.  Dispense: 90 tablet; Refill: 3    4. Class 3 severe obesity due to excess calories without serious comorbidity with body mass index (BMI) of 40.0 to 44.9 in adult  Comments:  info via avs; pt is pregnant         Health maintenance information provided via patient plan (after visit summary).   Counseled on age appropriate health screenings and immunizations.  Encouraged exercise and healthy diet.    Class 3 Severe Obesity (BMI >=40). Obesity-related health conditions include the following: none. Obesity is unchanged. BMI is is above average; BMI management plan is completed. We discussed Information on healthy weight added to patient's  after visit summary.      Return in about 1 year (around 3/1/2025) for Annual physical.     Kami Khan MD

## 2024-03-06 ENCOUNTER — LAB (OUTPATIENT)
Dept: LAB | Facility: HOSPITAL | Age: 34
End: 2024-03-06
Payer: COMMERCIAL

## 2024-03-06 ENCOUNTER — TRANSCRIBE ORDERS (OUTPATIENT)
Dept: LAB | Facility: HOSPITAL | Age: 34
End: 2024-03-06
Payer: COMMERCIAL

## 2024-03-06 DIAGNOSIS — Z34.81 PRENATAL CARE, SUBSEQUENT PREGNANCY, FIRST TRIMESTER: ICD-10-CM

## 2024-03-06 DIAGNOSIS — Z3A.11 11 WEEKS GESTATION OF PREGNANCY: ICD-10-CM

## 2024-03-06 DIAGNOSIS — Z34.81 PRENATAL CARE, SUBSEQUENT PREGNANCY, FIRST TRIMESTER: Primary | ICD-10-CM

## 2024-03-06 LAB
ABO GROUP BLD: NORMAL
AMPHET+METHAMPHET UR QL: NEGATIVE
AMPHETAMINES UR QL: NEGATIVE
BARBITURATES UR QL SCN: NEGATIVE
BASOPHILS # BLD AUTO: 0.03 10*3/MM3 (ref 0–0.2)
BASOPHILS NFR BLD AUTO: 0.4 % (ref 0–1.5)
BENZODIAZ UR QL SCN: NEGATIVE
BILIRUB UR QL STRIP: NEGATIVE
BLD GP AB SCN SERPL QL: NEGATIVE
BUPRENORPHINE SERPL-MCNC: NEGATIVE NG/ML
CANNABINOIDS SERPL QL: NEGATIVE
CLARITY UR: CLEAR
COCAINE UR QL: NEGATIVE
COLOR UR: YELLOW
DEPRECATED RDW RBC AUTO: 40 FL (ref 37–54)
EOSINOPHIL # BLD AUTO: 0.06 10*3/MM3 (ref 0–0.4)
EOSINOPHIL NFR BLD AUTO: 0.8 % (ref 0.3–6.2)
ERYTHROCYTE [DISTWIDTH] IN BLOOD BY AUTOMATED COUNT: 12.5 % (ref 12.3–15.4)
FENTANYL UR-MCNC: NEGATIVE NG/ML
GLUCOSE UR STRIP-MCNC: NEGATIVE MG/DL
HBA1C MFR BLD: 4.9 % (ref 4.8–5.6)
HBV SURFACE AG SERPL QL IA: NORMAL
HCT VFR BLD AUTO: 39.4 % (ref 34–46.6)
HCV AB SER DONR QL: NORMAL
HGB BLD-MCNC: 13.2 G/DL (ref 12–15.9)
HGB UR QL STRIP.AUTO: NEGATIVE
HIV 1+2 AB+HIV1 P24 AG SERPL QL IA: NORMAL
HOLD SPECIMEN: NORMAL
IMM GRANULOCYTES # BLD AUTO: 0.03 10*3/MM3 (ref 0–0.05)
IMM GRANULOCYTES NFR BLD AUTO: 0.4 % (ref 0–0.5)
KETONES UR QL STRIP: NEGATIVE
LEUKOCYTE ESTERASE UR QL STRIP.AUTO: NEGATIVE
LYMPHOCYTES # BLD AUTO: 1.29 10*3/MM3 (ref 0.7–3.1)
LYMPHOCYTES NFR BLD AUTO: 17.6 % (ref 19.6–45.3)
MCH RBC QN AUTO: 29.3 PG (ref 26.6–33)
MCHC RBC AUTO-ENTMCNC: 33.5 G/DL (ref 31.5–35.7)
MCV RBC AUTO: 87.6 FL (ref 79–97)
METHADONE UR QL SCN: NEGATIVE
MONOCYTES # BLD AUTO: 0.39 10*3/MM3 (ref 0.1–0.9)
MONOCYTES NFR BLD AUTO: 5.3 % (ref 5–12)
NEUTROPHILS NFR BLD AUTO: 5.54 10*3/MM3 (ref 1.7–7)
NEUTROPHILS NFR BLD AUTO: 75.5 % (ref 42.7–76)
NITRITE UR QL STRIP: NEGATIVE
NRBC BLD AUTO-RTO: 0 /100 WBC (ref 0–0.2)
OPIATES UR QL: NEGATIVE
OXYCODONE UR QL SCN: NEGATIVE
PCP UR QL SCN: NEGATIVE
PH UR STRIP.AUTO: 6.5 [PH] (ref 5–8)
PLATELET # BLD AUTO: 220 10*3/MM3 (ref 140–450)
PMV BLD AUTO: 12 FL (ref 6–12)
PROT UR QL STRIP: ABNORMAL
RBC # BLD AUTO: 4.5 10*6/MM3 (ref 3.77–5.28)
RH BLD: NEGATIVE
SP GR UR STRIP: 1.03 (ref 1–1.03)
TRICYCLICS UR QL SCN: NEGATIVE
UROBILINOGEN UR QL STRIP: ABNORMAL
WBC NRBC COR # BLD AUTO: 7.34 10*3/MM3 (ref 3.4–10.8)

## 2024-03-06 PROCEDURE — 81003 URINALYSIS AUTO W/O SCOPE: CPT

## 2024-03-06 PROCEDURE — 86901 BLOOD TYPING SEROLOGIC RH(D): CPT

## 2024-03-06 PROCEDURE — 87491 CHLMYD TRACH DNA AMP PROBE: CPT

## 2024-03-06 PROCEDURE — 87661 TRICHOMONAS VAGINALIS AMPLIF: CPT

## 2024-03-06 PROCEDURE — 85025 COMPLETE CBC W/AUTO DIFF WBC: CPT

## 2024-03-06 PROCEDURE — 87086 URINE CULTURE/COLONY COUNT: CPT

## 2024-03-06 PROCEDURE — 83036 HEMOGLOBIN GLYCOSYLATED A1C: CPT

## 2024-03-06 PROCEDURE — 86850 RBC ANTIBODY SCREEN: CPT

## 2024-03-06 PROCEDURE — G0432 EIA HIV-1/HIV-2 SCREEN: HCPCS

## 2024-03-06 PROCEDURE — 86780 TREPONEMA PALLIDUM: CPT

## 2024-03-06 PROCEDURE — 86803 HEPATITIS C AB TEST: CPT

## 2024-03-06 PROCEDURE — 86762 RUBELLA ANTIBODY: CPT

## 2024-03-06 PROCEDURE — 87591 N.GONORRHOEAE DNA AMP PROB: CPT

## 2024-03-06 PROCEDURE — 86900 BLOOD TYPING SEROLOGIC ABO: CPT

## 2024-03-06 PROCEDURE — 36415 COLL VENOUS BLD VENIPUNCTURE: CPT

## 2024-03-06 PROCEDURE — 80307 DRUG TEST PRSMV CHEM ANLYZR: CPT

## 2024-03-06 PROCEDURE — 87340 HEPATITIS B SURFACE AG IA: CPT

## 2024-03-06 PROCEDURE — 86787 VARICELLA-ZOSTER ANTIBODY: CPT

## 2024-03-07 LAB
RUBV IGG SERPL IA-ACNC: 2.57 INDEX
VZV IGG SER IA-ACNC: <135 INDEX

## 2024-03-08 LAB
BACTERIA SPEC AEROBE CULT: NO GROWTH
C TRACH RRNA SPEC QL NAA+PROBE: NEGATIVE
N GONORRHOEA RRNA SPEC QL NAA+PROBE: NEGATIVE
REF LAB TEST RESULTS: NORMAL
T VAGINALIS RRNA SPEC QL NAA+PROBE: NEGATIVE
TREPONEMA PALLIDUM IGG+IGM AB [PRESENCE] IN SERUM OR PLASMA BY IMMUNOASSAY: NON REACTIVE